# Patient Record
Sex: FEMALE | Race: BLACK OR AFRICAN AMERICAN | NOT HISPANIC OR LATINO | Employment: FULL TIME | URBAN - METROPOLITAN AREA
[De-identification: names, ages, dates, MRNs, and addresses within clinical notes are randomized per-mention and may not be internally consistent; named-entity substitution may affect disease eponyms.]

---

## 2019-08-06 LAB
PAP RECOMMENDATION EXT: NORMAL
PAP SMEAR: NORMAL

## 2021-11-22 ENCOUNTER — HISTORICAL (OUTPATIENT)
Dept: ADMINISTRATIVE | Facility: HOSPITAL | Age: 26
End: 2021-11-22

## 2022-03-30 ENCOUNTER — HISTORICAL (OUTPATIENT)
Dept: ADMINISTRATIVE | Facility: HOSPITAL | Age: 27
End: 2022-03-30

## 2022-03-30 LAB
ABS NEUT (OLG): 5.45 (ref 2.1–9.2)
ALBUMIN SERPL-MCNC: 3.9 G/DL (ref 3.5–5)
ALBUMIN/GLOB SERPL: 1.1 {RATIO} (ref 1.1–2)
ALP SERPL-CCNC: 50 U/L (ref 40–150)
ALT SERPL-CCNC: 9 U/L (ref 0–55)
AST SERPL-CCNC: 11 U/L (ref 5–34)
BASOPHILS # BLD AUTO: 0 10*3/UL (ref 0–0.2)
BASOPHILS NFR BLD AUTO: 0 %
BILIRUB SERPL-MCNC: 1 MG/DL
BILIRUBIN DIRECT+TOT PNL SERPL-MCNC: 0.4 (ref 0–0.5)
BILIRUBIN DIRECT+TOT PNL SERPL-MCNC: 0.6 (ref 0–0.8)
BUN SERPL-MCNC: 11.4 MG/DL (ref 7–18.7)
CALCIUM SERPL-MCNC: 9.5 MG/DL (ref 8.7–10.5)
CHLORIDE SERPL-SCNC: 103 MMOL/L (ref 98–107)
CHOLEST SERPL-MCNC: 169 MG/DL
CHOLEST/HDLC SERPL: 2 {RATIO} (ref 0–5)
CO2 SERPL-SCNC: 24 MMOL/L (ref 22–29)
CREAT SERPL-MCNC: 0.65 MG/DL (ref 0.55–1.02)
EOSINOPHIL # BLD AUTO: 0.1 10*3/UL (ref 0–0.9)
EOSINOPHIL NFR BLD AUTO: 1 %
ERYTHROCYTE [DISTWIDTH] IN BLOOD BY AUTOMATED COUNT: 18.6 % (ref 11.5–17)
GLOBULIN SER-MCNC: 3.5 G/DL (ref 2.4–3.5)
GLUCOSE SERPL-MCNC: 68 MG/DL (ref 74–100)
HCT VFR BLD AUTO: 37.1 % (ref 37–47)
HDLC SERPL-MCNC: 69 MG/DL (ref 35–60)
HEMOLYSIS INTERF INDEX SERPL-ACNC: <0
HGB BLD-MCNC: 11.7 G/DL (ref 12–16)
ICTERIC INTERF INDEX SERPL-ACNC: 1
IGA SERPL-MCNC: 121 MG/DL (ref 65–421)
LDLC SERPL CALC-MCNC: 85 MG/DL (ref 50–140)
LIPASE SERPL-CCNC: 24 U/L
LIPEMIC INTERF INDEX SERPL-ACNC: 0
LYMPHOCYTES # BLD AUTO: 1.8 10*3/UL (ref 0.6–4.6)
LYMPHOCYTES NFR BLD AUTO: 23 %
MANUAL DIFF? (OHS): NO
MCH RBC QN AUTO: 22.9 PG (ref 27–31)
MCHC RBC AUTO-ENTMCNC: 31.5 G/DL (ref 33–36)
MCV RBC AUTO: 72.7 FL (ref 80–94)
MONOCYTES # BLD AUTO: 0.4 10*3/UL (ref 0.1–1.3)
MONOCYTES NFR BLD AUTO: 5 %
NEUTROPHILS # BLD AUTO: 5.45 10*3/UL (ref 2.1–9.2)
NEUTROPHILS NFR BLD AUTO: 70 %
PLATELET # BLD AUTO: 349 10*3/UL (ref 130–400)
PMV BLD AUTO: 10.3 FL (ref 9.4–12.4)
POS ERR1 (OHS): NORMAL
POTASSIUM SERPL-SCNC: 4.2 MMOL/L (ref 3.5–5.1)
PROT SERPL-MCNC: 7.4 G/DL (ref 6.4–8.3)
RBC # BLD AUTO: 5.1 10*6/UL (ref 4.2–5.4)
SODIUM SERPL-SCNC: 139 MMOL/L (ref 136–145)
TRIGL SERPL-MCNC: 74 MG/DL (ref 37–140)
TSH SERPL-ACNC: 1.08 M[IU]/L (ref 0.35–4.94)
VLDLC SERPL CALC-MCNC: 15 MG/DL
WBC # SPEC AUTO: 7.8 10*3/UL (ref 4.5–11.5)

## 2022-04-20 ENCOUNTER — HISTORICAL (OUTPATIENT)
Dept: ANESTHESIOLOGY | Facility: HOSPITAL | Age: 27
End: 2022-04-20
Payer: COMMERCIAL

## 2022-04-20 ENCOUNTER — HISTORICAL (OUTPATIENT)
Dept: ADMINISTRATIVE | Facility: HOSPITAL | Age: 27
End: 2022-04-20
Payer: COMMERCIAL

## 2022-05-25 ENCOUNTER — HOSPITAL ENCOUNTER (OUTPATIENT)
Dept: RADIOLOGY | Facility: HOSPITAL | Age: 27
Discharge: HOME | End: 2022-05-25
Attending: FAMILY MEDICINE
Payer: COMMERCIAL

## 2022-05-25 ENCOUNTER — OFFICE VISIT (OUTPATIENT)
Dept: FAMILY MEDICINE | Facility: CLINIC | Age: 27
End: 2022-05-25
Payer: COMMERCIAL

## 2022-05-25 VITALS
OXYGEN SATURATION: 99 % | SYSTOLIC BLOOD PRESSURE: 120 MMHG | WEIGHT: 153.69 LBS | RESPIRATION RATE: 20 BRPM | BODY MASS INDEX: 30.98 KG/M2 | DIASTOLIC BLOOD PRESSURE: 75 MMHG | HEIGHT: 59 IN | HEART RATE: 88 BPM | TEMPERATURE: 99 F

## 2022-05-25 DIAGNOSIS — M25.532 LEFT WRIST PAIN: ICD-10-CM

## 2022-05-25 DIAGNOSIS — F90.2 ATTENTION DEFICIT HYPERACTIVITY DISORDER (ADHD), COMBINED TYPE: Chronic | ICD-10-CM

## 2022-05-25 PROBLEM — M25.539 ARTHRALGIA OF WRIST: Status: ACTIVE | Noted: 2022-04-08

## 2022-05-25 PROBLEM — E66.9 OBESITY: Status: ACTIVE | Noted: 2022-05-25

## 2022-05-25 PROCEDURE — 99203 OFFICE O/P NEW LOW 30 MIN: CPT | Mod: ,,, | Performed by: FAMILY MEDICINE

## 2022-05-25 PROCEDURE — 1159F PR MEDICATION LIST DOCUMENTED IN MEDICAL RECORD: ICD-10-PCS | Mod: CPTII,,, | Performed by: FAMILY MEDICINE

## 2022-05-25 PROCEDURE — 1160F RVW MEDS BY RX/DR IN RCRD: CPT | Mod: CPTII,,, | Performed by: FAMILY MEDICINE

## 2022-05-25 PROCEDURE — 1160F PR REVIEW ALL MEDS BY PRESCRIBER/CLIN PHARMACIST DOCUMENTED: ICD-10-PCS | Mod: CPTII,,, | Performed by: FAMILY MEDICINE

## 2022-05-25 PROCEDURE — 3074F SYST BP LT 130 MM HG: CPT | Mod: CPTII,,, | Performed by: FAMILY MEDICINE

## 2022-05-25 PROCEDURE — 3078F DIAST BP <80 MM HG: CPT | Mod: CPTII,,, | Performed by: FAMILY MEDICINE

## 2022-05-25 PROCEDURE — 1159F MED LIST DOCD IN RCRD: CPT | Mod: CPTII,,, | Performed by: FAMILY MEDICINE

## 2022-05-25 PROCEDURE — 3078F PR MOST RECENT DIASTOLIC BLOOD PRESSURE < 80 MM HG: ICD-10-PCS | Mod: CPTII,,, | Performed by: FAMILY MEDICINE

## 2022-05-25 PROCEDURE — 3008F BODY MASS INDEX DOCD: CPT | Mod: CPTII,,, | Performed by: FAMILY MEDICINE

## 2022-05-25 PROCEDURE — 99203 PR OFFICE/OUTPT VISIT, NEW, LEVL III, 30-44 MIN: ICD-10-PCS | Mod: ,,, | Performed by: FAMILY MEDICINE

## 2022-05-25 PROCEDURE — 73110 X-RAY EXAM OF WRIST: CPT | Mod: TC,LT

## 2022-05-25 PROCEDURE — 3074F PR MOST RECENT SYSTOLIC BLOOD PRESSURE < 130 MM HG: ICD-10-PCS | Mod: CPTII,,, | Performed by: FAMILY MEDICINE

## 2022-05-25 PROCEDURE — 3008F PR BODY MASS INDEX (BMI) DOCUMENTED: ICD-10-PCS | Mod: CPTII,,, | Performed by: FAMILY MEDICINE

## 2022-05-25 RX ORDER — DEXTROAMPHETAMINE SACCHARATE, AMPHETAMINE ASPARTATE, DEXTROAMPHETAMINE SULFATE AND AMPHETAMINE SULFATE 7.5; 7.5; 7.5; 7.5 MG/1; MG/1; MG/1; MG/1
1 TABLET ORAL 2 TIMES DAILY
COMMUNITY
Start: 2022-01-26 | End: 2022-05-25 | Stop reason: SDUPTHER

## 2022-05-25 RX ORDER — DEXTROAMPHETAMINE SACCHARATE, AMPHETAMINE ASPARTATE, DEXTROAMPHETAMINE SULFATE AND AMPHETAMINE SULFATE 7.5; 7.5; 7.5; 7.5 MG/1; MG/1; MG/1; MG/1
1 TABLET ORAL 2 TIMES DAILY
Qty: 60 TABLET | Refills: 0 | Status: SHIPPED | OUTPATIENT
Start: 2022-05-25 | End: 2022-06-26 | Stop reason: SDUPTHER

## 2022-05-25 RX ORDER — MAGNESIUM CARB/ALUMINUM HYDROX 105-160MG
2 TABLET,CHEWABLE ORAL 4 TIMES DAILY PRN
COMMUNITY
Start: 2022-05-04 | End: 2023-05-16 | Stop reason: ALTCHOICE

## 2022-05-25 RX ORDER — PANTOPRAZOLE SODIUM 40 MG/1
40 TABLET, DELAYED RELEASE ORAL DAILY
COMMUNITY
Start: 2022-04-14 | End: 2023-06-07

## 2022-05-25 RX ORDER — FAMOTIDINE 40 MG/1
40 TABLET, FILM COATED ORAL DAILY
COMMUNITY
Start: 2022-03-16 | End: 2022-10-26 | Stop reason: SDUPTHER

## 2022-05-25 RX ORDER — ETONOGESTREL AND ETHINYL ESTRADIOL VAGINAL RING .015; .12 MG/D; MG/D
RING VAGINAL
COMMUNITY
Start: 2022-04-27

## 2022-05-25 NOTE — PROGRESS NOTES
Subjective:      Patient ID: Yue Hickman is a 26 y.o. female.    Chief Complaint: Establish Care (Needs new PCP to follow her care and manage her ADD./Wrist pain-left.)    ADHD patient here to establish care for refills and surveillance of same.    Also, here for left wrist pain that happened at work. (onset 03/19/2022).  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    Has been wearing a brace since until this week. No x-ray, PO steroids were prescribed. Pain was improved but not absent as she rested her wrist or wore the brace. Now that she is not wearing the brace, she is experiencing pain again. Also, describes morning stiffness to wrist that takes hours to ease up if at all.       Problem List Items Addressed This Visit     ADHD (attention deficit hyperactivity disorder) (Chronic)    Relevant Medications    dextroamphetamine-amphetamine 30 mg Tab    Left wrist pain    Relevant Orders    X-Ray Wrist Complete Left          The patient's Health Maintenance was reviewed and the following appears to be due:   Health Maintenance Due   Topic Date Due    Hepatitis C Screening  Never done    COVID-19 Vaccine (1) Never done    HIV Screening  Never done    Pap Smear  Never done    TETANUS VACCINE  08/09/2017       Past Medical History:  Past Medical History:   Diagnosis Date    ADHD (attention deficit hyperactivity disorder)     Esophagitis     GERD (gastroesophageal reflux disease)      Past Surgical History:   Procedure Laterality Date    NEUROFIBROMA EXCISION      SINUS SURGERY      TONSILLECTOMY       Review of patient's allergies indicates:  No Known Allergies  Current Outpatient Medications on File Prior to Visit   Medication Sig Dispense Refill    etonogestreL-ethinyl estradioL (NUVARING) 0.12-0.015 mg/24 hr vaginal ring Place vaginally.      famotidine (PEPCID) 40 MG tablet Take 40 mg by mouth once daily.      GAVISCON EXTRA STRENGTH 160-105 mg  "Chew Take 2 tablets by mouth 4 (four) times daily as needed.      pantoprazole (PROTONIX) 40 MG tablet Take 40 mg by mouth once daily.      [DISCONTINUED] dextroamphetamine-amphetamine 30 mg Tab Take 1 tablet by mouth 2 (two) times daily.       No current facility-administered medications on file prior to visit.     Social History     Socioeconomic History    Marital status: Single    Number of children: 0   Tobacco Use    Smoking status: Never Smoker    Smokeless tobacco: Never Used   Substance and Sexual Activity    Alcohol use: Not Currently    Drug use: Not Currently     Family History   Problem Relation Age of Onset    Diabetes Mother     Hypertension Mother     Cancer Father        Review of Systems   Musculoskeletal: Positive for arthralgias and joint swelling.   All other systems reviewed and are negative.      Objective:   /75 (BP Location: Right arm, Patient Position: Sitting, BP Method: Large (Automatic))   Pulse 88   Temp 98.6 °F (37 °C) (Oral)   Resp 20   Ht 4' 11" (1.499 m)   Wt 69.7 kg (153 lb 11.2 oz)   LMP 05/21/2022   SpO2 99%   BMI 31.04 kg/m²     Physical Exam  Vitals and nursing note reviewed.   Constitutional:       Appearance: Normal appearance. She is obese.   HENT:      Head: Normocephalic and atraumatic.      Right Ear: Tympanic membrane, ear canal and external ear normal.      Left Ear: Tympanic membrane, ear canal and external ear normal.      Nose: Nose normal.      Mouth/Throat:      Mouth: Mucous membranes are moist.      Pharynx: Oropharynx is clear.   Eyes:      Extraocular Movements: Extraocular movements intact.      Conjunctiva/sclera: Conjunctivae normal.      Pupils: Pupils are equal, round, and reactive to light.   Cardiovascular:      Rate and Rhythm: Normal rate and regular rhythm.      Pulses: Normal pulses.      Heart sounds: Normal heart sounds.   Pulmonary:      Effort: Pulmonary effort is normal.      Breath sounds: Normal breath sounds. "   Abdominal:      General: Abdomen is flat. Bowel sounds are normal.      Palpations: Abdomen is soft.   Musculoskeletal:      Right wrist: Normal.      Left wrist: Tenderness, bony tenderness and snuff box tenderness present.      Right hand: Normal.      Left hand: Decreased range of motion.      Cervical back: Normal range of motion and neck supple.      Comments: Left thumb - pain with extension against resistance. Pain is localized to the base of thumb and distal end of radius. No tenderness to palpation of other carpal bones, No pronounced tendon swelling or tenderness, but pain with ROM and especially extension (passive and active, and against resistance)   Skin:     General: Skin is warm and dry.      Capillary Refill: Capillary refill takes less than 2 seconds.   Neurological:      General: No focal deficit present.      Mental Status: She is alert and oriented to person, place, and time. Mental status is at baseline.   Psychiatric:         Mood and Affect: Mood normal.         Behavior: Behavior normal.         Thought Content: Thought content normal.         Judgment: Judgment normal.         Historical on 03/30/2022   Component Date Value Ref Range Status    WBC 03/30/2022 7.8  4.5 - 11.5 Final    RBC 03/30/2022 5.10  4.20 - 5.40 Final    Hgb 03/30/2022 11.7  12.0 - 16.0 Final    Hct 03/30/2022 37.1  37.0 - 47.0 Final    MCV 03/30/2022 72.7  80.0 - 94.0 Final    MCH 03/30/2022 22.9  27.0 - 31.0 Final    MCHC 03/30/2022 31.5  33.0 - 36.0 Final    RDW 03/30/2022 18.6  11.5 - 17.0 Final    Platelet 03/30/2022 349  130 - 400 Final    MPV 03/30/2022 10.3  9.4 - 12.4 Final    Abs Neut 03/30/2022 5.45  2.10 - 9.20 Final    Manual Diff? 03/30/2022 No   Final    POS ERR1 03/30/2022 POSITIVEMORPH   Final    Neut % 03/30/2022 70   Final    Lymph % 03/30/2022 23   Final    Mono % 03/30/2022 5   Final    Eos % 03/30/2022 1   Final    Basophil % 03/30/2022 0   Final    Lymph # 03/30/2022 1.8  0.6 -  4.6 Final    Neut # 03/30/2022 5.45  2.10 - 9.20 Final    Mono # 03/30/2022 0.4  0.1 - 1.3 Final    Eos # 03/30/2022 0.1  0.0 - 0.9 Final    Baso # 03/30/2022 0.0  0.0 - 0.2 Final    Cholesterol Total 03/30/2022 169  <=200 Final    HDL Cholesterol 03/30/2022 69  35 - 60 Final    Triglyceride 03/30/2022 74  37 - 140 Final    Very Low Density Lipoprotein 03/30/2022 15   Final    Cholesterol/HDL Ratio 03/30/2022 2  0 - 5 Final    LDL Cholesterol 03/30/2022 85.00  50.00 - 140.00 Final    IgA Level 03/30/2022 121.0  65.0 - 421.0 Final    Sodium Level 03/30/2022 139  136 - 145 Final    Potassium Level 03/30/2022 4.2  3.5 - 5.1 Final    Chloride 03/30/2022 103  98 - 107 Final    Carbon Dioxide 03/30/2022 24  22 - 29 Final    Glucose Level 03/30/2022 68  74 - 100 Final    Blood Urea Nitrogen 03/30/2022 11.4  7.0 - 18.7 Final    Creatinine 03/30/2022 0.65  0.55 - 1.02 Final    Calcium Level Total 03/30/2022 9.5  8.7 - 10.5 Final    Albumin Level 03/30/2022 3.9  3.5 - 5.0 Final    Protein Total 03/30/2022 7.4  6.4 - 8.3 Final    Globulin 03/30/2022 3.5  2.4 - 3.5 Final    Albumin/Globulin Ratio 03/30/2022 1.1  1.1 - 2.0 Final    Alkaline Phosphatase 03/30/2022 50  40 - 150 Final    Bilirubin Total 03/30/2022 1.0  <=1.5 Final    Bilirubin Direct 03/30/2022 0.4  0.0 - 0.5 Final    Bilirubin Indirect 03/30/2022 0.60  0.00 - 0.80 Final    Aspartate Aminotransferase 03/30/2022 11  5 - 34 Final    Alanine Aminotransferase 03/30/2022 9  0 - 55 Final    Hemolysis 03/30/2022 <0   Final    Icterus 03/30/2022 1   Final    Lipemia 03/30/2022 0   Final    Lipase Level 03/30/2022 24  <=60 Final    Estimated GFR- 03/30/2022 >60   Final    Estimated GFR-Non  03/30/2022 >60   Final    Thyroid Stimulating Hormone 03/30/2022 1.0768  0.3500 - 4.9400 Final       NM Hepatobiliary Imaging with GB (HIDA)     INDICATION: R19.4  Upper abdominal pain. Heartburn.     TECHNIQUE: 7.9 mCi  of intravenous Choletec was administered followed  by focused gamma camera imaging of the abdomen.  8 ounces of boost was  given orally for purpose of inducing gallbladder contraction.     FINDINGS:     There is prompt hepatocellular uptake. Central biliary activity  identified with gallbladder filling by 15 minutes. There is  progressive gallbladder filling over 60 minutes. Small bowel activity  is identified by 30 minutes progressing through 60 minutes of imaging.        Region of interest was drawn over the gallbladder and counting  statistics used to determine the ejection fraction. Ejection fraction  was calculated to be 49%, above the normal range of greater than 35%.     IMPRESSION:     Negative hepatobiliary scintigraphy with normal gallbladder ejection  fraction.       Electronically Signed By: Gage Reyes MD  Date/Time Signed: 04/20/2022 10:49       Assessment:     1. Left wrist pain    2. Attention deficit hyperactivity disorder (ADHD), combined type      Plan:   I have changed Yue Hickman's dextroamphetamine-amphetamine. I am also having her maintain her etonogestreL-ethinyl estradioL, famotidine, pantoprazole, and GAVISCON EXTRA STRENGTH.  Problem List Items Addressed This Visit     ADHD (attention deficit hyperactivity disorder) (Chronic)    Relevant Medications    dextroamphetamine-amphetamine 30 mg Tab    Left wrist pain    Relevant Orders    X-Ray Wrist Complete Left        No follow-ups on file.    Yue was seen today for establish care.    Diagnoses and all orders for this visit:    Left wrist pain  -     X-Ray Wrist Complete Left; Future    Attention deficit hyperactivity disorder (ADHD), combined type  -     dextroamphetamine-amphetamine 30 mg Tab; Take 1 tablet (30 mg total) by mouth 2 (two) times daily.      Medications Ordered This Encounter   Medications    dextroamphetamine-amphetamine 30 mg Tab     Sig: Take 1 tablet (30 mg total) by mouth 2 (two) times daily.     Dispense:  60  tablet     Refill:  0     [unfilled]  Orders Placed This Encounter   Procedures    X-Ray Wrist Complete Left     Standing Status:   Future     Standing Expiration Date:   5/25/2023     Order Specific Question:   May the Radiologist modify the order per protocol to meet the clinical needs of the patient?     Answer:   Yes     Order Specific Question:   Release to patient     Answer:   Immediate       Medication List with Changes/Refills   Current Medications    ETONOGESTREL-ETHINYL ESTRADIOL (NUVARING) 0.12-0.015 MG/24 HR VAGINAL RING    Place vaginally.    FAMOTIDINE (PEPCID) 40 MG TABLET    Take 40 mg by mouth once daily.    GAVISCON EXTRA STRENGTH 160-105 MG CHEW    Take 2 tablets by mouth 4 (four) times daily as needed.    PANTOPRAZOLE (PROTONIX) 40 MG TABLET    Take 40 mg by mouth once daily.   Changed and/or Refilled Medications    Modified Medication Previous Medication    DEXTROAMPHETAMINE-AMPHETAMINE 30 MG TAB dextroamphetamine-amphetamine 30 mg Tab       Take 1 tablet (30 mg total) by mouth 2 (two) times daily.    Take 1 tablet by mouth 2 (two) times daily.      Medication List with Changes/Refills   Current Medications    ETONOGESTREL-ETHINYL ESTRADIOL (NUVARING) 0.12-0.015 MG/24 HR VAGINAL RING    Place vaginally.       Start Date: 4/27/2022 End Date: --    FAMOTIDINE (PEPCID) 40 MG TABLET    Take 40 mg by mouth once daily.       Start Date: 3/16/2022 End Date: --    GAVISCON EXTRA STRENGTH 160-105 MG CHEW    Take 2 tablets by mouth 4 (four) times daily as needed.       Start Date: 5/4/2022  End Date: --    PANTOPRAZOLE (PROTONIX) 40 MG TABLET    Take 40 mg by mouth once daily.       Start Date: 4/14/2022 End Date: --   Changed and/or Refilled Medications    Modified Medication Previous Medication    DEXTROAMPHETAMINE-AMPHETAMINE 30 MG TAB dextroamphetamine-amphetamine 30 mg Tab       Take 1 tablet (30 mg total) by mouth 2 (two) times daily.    Take 1 tablet by mouth 2 (two) times daily.       Start  Date: 5/25/2022 End Date: 6/24/2022    Start Date: 1/26/2022 End Date: 5/25/2022

## 2022-06-01 LAB
PAP RECOMMENDATION EXT: NORMAL
PAP SMEAR: NORMAL

## 2022-06-22 DIAGNOSIS — F90.2 ATTENTION DEFICIT HYPERACTIVITY DISORDER (ADHD), COMBINED TYPE: Chronic | ICD-10-CM

## 2022-06-22 NOTE — TELEPHONE ENCOUNTER
----- Message from Corrina Kennedy Patient Care Assistant sent at 6/22/2022  2:29 PM CDT -----  Regarding: med refill  Type:  RX Refill Request    Who Called: pt  Refill or New Rx: refill  RX Name and Strength: adderall  How is the patient currently taking it? (ex. 1XDay): 1 daily  Is this a 30 day or 90 day RX: 30  Preferred Pharmacy with phone number: [facility] or Mail Order: Local  Ordering Provider: Dr. mchugh  Would the patient rather a call back or a response via [facility]? C/b  Best Call Back Number: [phone]  Additional Information: pt states normally she makes an delores every 3 month and then she is able to get her script refilled until then and pharmacy is saying she has no refills. Pt is wondering what is going on with the refill status could yall please call pt back.

## 2022-06-26 DIAGNOSIS — F90.2 ATTENTION DEFICIT HYPERACTIVITY DISORDER (ADHD), COMBINED TYPE: Chronic | ICD-10-CM

## 2022-06-28 RX ORDER — DEXTROAMPHETAMINE SACCHARATE, AMPHETAMINE ASPARTATE, DEXTROAMPHETAMINE SULFATE AND AMPHETAMINE SULFATE 7.5; 7.5; 7.5; 7.5 MG/1; MG/1; MG/1; MG/1
1 TABLET ORAL 2 TIMES DAILY
Qty: 60 TABLET | Refills: 0 | Status: SHIPPED | OUTPATIENT
Start: 2022-06-28 | End: 2022-08-12 | Stop reason: SDUPTHER

## 2022-06-28 RX ORDER — DEXTROAMPHETAMINE SACCHARATE, AMPHETAMINE ASPARTATE, DEXTROAMPHETAMINE SULFATE AND AMPHETAMINE SULFATE 7.5; 7.5; 7.5; 7.5 MG/1; MG/1; MG/1; MG/1
1 TABLET ORAL 2 TIMES DAILY
Qty: 60 TABLET | Refills: 0 | Status: SHIPPED | OUTPATIENT
Start: 2022-06-28 | End: 2022-07-28

## 2022-08-12 DIAGNOSIS — F90.2 ATTENTION DEFICIT HYPERACTIVITY DISORDER (ADHD), COMBINED TYPE: Chronic | ICD-10-CM

## 2022-08-15 RX ORDER — DEXTROAMPHETAMINE SACCHARATE, AMPHETAMINE ASPARTATE, DEXTROAMPHETAMINE SULFATE AND AMPHETAMINE SULFATE 7.5; 7.5; 7.5; 7.5 MG/1; MG/1; MG/1; MG/1
1 TABLET ORAL 2 TIMES DAILY
Qty: 60 TABLET | Refills: 0 | Status: SHIPPED | OUTPATIENT
Start: 2022-08-15 | End: 2022-09-17 | Stop reason: SDUPTHER

## 2022-09-17 DIAGNOSIS — F90.2 ATTENTION DEFICIT HYPERACTIVITY DISORDER (ADHD), COMBINED TYPE: Chronic | ICD-10-CM

## 2022-09-19 RX ORDER — DEXTROAMPHETAMINE SACCHARATE, AMPHETAMINE ASPARTATE, DEXTROAMPHETAMINE SULFATE AND AMPHETAMINE SULFATE 7.5; 7.5; 7.5; 7.5 MG/1; MG/1; MG/1; MG/1
1 TABLET ORAL 2 TIMES DAILY
Qty: 60 TABLET | Refills: 0 | Status: SHIPPED | OUTPATIENT
Start: 2022-09-19 | End: 2022-10-26 | Stop reason: SDUPTHER

## 2022-10-13 DIAGNOSIS — F90.2 ATTENTION DEFICIT HYPERACTIVITY DISORDER (ADHD), COMBINED TYPE: Chronic | ICD-10-CM

## 2022-10-13 RX ORDER — DEXTROAMPHETAMINE SACCHARATE, AMPHETAMINE ASPARTATE, DEXTROAMPHETAMINE SULFATE AND AMPHETAMINE SULFATE 7.5; 7.5; 7.5; 7.5 MG/1; MG/1; MG/1; MG/1
1 TABLET ORAL 2 TIMES DAILY
Qty: 60 TABLET | Refills: 0 | OUTPATIENT
Start: 2022-10-13 | End: 2022-11-12

## 2022-10-26 ENCOUNTER — OFFICE VISIT (OUTPATIENT)
Dept: FAMILY MEDICINE | Facility: CLINIC | Age: 27
End: 2022-10-26
Payer: COMMERCIAL

## 2022-10-26 VITALS
RESPIRATION RATE: 18 BRPM | TEMPERATURE: 98 F | HEIGHT: 59 IN | HEART RATE: 99 BPM | BODY MASS INDEX: 30.58 KG/M2 | OXYGEN SATURATION: 99 % | WEIGHT: 151.69 LBS | SYSTOLIC BLOOD PRESSURE: 130 MMHG | DIASTOLIC BLOOD PRESSURE: 80 MMHG

## 2022-10-26 DIAGNOSIS — Z23 NEED FOR VACCINATION: ICD-10-CM

## 2022-10-26 DIAGNOSIS — K21.9 GASTROESOPHAGEAL REFLUX DISEASE WITHOUT ESOPHAGITIS: Chronic | ICD-10-CM

## 2022-10-26 DIAGNOSIS — Z28.21 VACCINATION REFUSED BY PATIENT: ICD-10-CM

## 2022-10-26 DIAGNOSIS — F90.2 ATTENTION DEFICIT HYPERACTIVITY DISORDER (ADHD), COMBINED TYPE: Primary | Chronic | ICD-10-CM

## 2022-10-26 DIAGNOSIS — Z12.4 CERVICAL CANCER SCREENING: ICD-10-CM

## 2022-10-26 PROCEDURE — 3075F SYST BP GE 130 - 139MM HG: CPT | Mod: CPTII,,, | Performed by: FAMILY MEDICINE

## 2022-10-26 PROCEDURE — 99214 OFFICE O/P EST MOD 30 MIN: CPT | Mod: ,,, | Performed by: FAMILY MEDICINE

## 2022-10-26 PROCEDURE — 1159F PR MEDICATION LIST DOCUMENTED IN MEDICAL RECORD: ICD-10-PCS | Mod: CPTII,,, | Performed by: FAMILY MEDICINE

## 2022-10-26 PROCEDURE — 1160F PR REVIEW ALL MEDS BY PRESCRIBER/CLIN PHARMACIST DOCUMENTED: ICD-10-PCS | Mod: CPTII,,, | Performed by: FAMILY MEDICINE

## 2022-10-26 PROCEDURE — 1160F RVW MEDS BY RX/DR IN RCRD: CPT | Mod: CPTII,,, | Performed by: FAMILY MEDICINE

## 2022-10-26 PROCEDURE — 1159F MED LIST DOCD IN RCRD: CPT | Mod: CPTII,,, | Performed by: FAMILY MEDICINE

## 2022-10-26 PROCEDURE — 3075F PR MOST RECENT SYSTOLIC BLOOD PRESS GE 130-139MM HG: ICD-10-PCS | Mod: CPTII,,, | Performed by: FAMILY MEDICINE

## 2022-10-26 PROCEDURE — 99214 PR OFFICE/OUTPT VISIT, EST, LEVL IV, 30-39 MIN: ICD-10-PCS | Mod: ,,, | Performed by: FAMILY MEDICINE

## 2022-10-26 PROCEDURE — 3079F DIAST BP 80-89 MM HG: CPT | Mod: CPTII,,, | Performed by: FAMILY MEDICINE

## 2022-10-26 PROCEDURE — 3079F PR MOST RECENT DIASTOLIC BLOOD PRESSURE 80-89 MM HG: ICD-10-PCS | Mod: CPTII,,, | Performed by: FAMILY MEDICINE

## 2022-10-26 RX ORDER — DEXTROAMPHETAMINE SACCHARATE, AMPHETAMINE ASPARTATE, DEXTROAMPHETAMINE SULFATE AND AMPHETAMINE SULFATE 7.5; 7.5; 7.5; 7.5 MG/1; MG/1; MG/1; MG/1
1 TABLET ORAL 2 TIMES DAILY
Qty: 60 TABLET | Refills: 0 | Status: SHIPPED | OUTPATIENT
Start: 2022-10-26 | End: 2022-11-25

## 2022-10-26 RX ORDER — DEXTROAMPHETAMINE SACCHARATE, AMPHETAMINE ASPARTATE, DEXTROAMPHETAMINE SULFATE AND AMPHETAMINE SULFATE 7.5; 7.5; 7.5; 7.5 MG/1; MG/1; MG/1; MG/1
1 TABLET ORAL 2 TIMES DAILY
Qty: 60 TABLET | Refills: 0 | Status: SHIPPED | OUTPATIENT
Start: 2022-12-25 | End: 2023-01-18 | Stop reason: SDUPTHER

## 2022-10-26 RX ORDER — DEXTROAMPHETAMINE SACCHARATE, AMPHETAMINE ASPARTATE, DEXTROAMPHETAMINE SULFATE AND AMPHETAMINE SULFATE 7.5; 7.5; 7.5; 7.5 MG/1; MG/1; MG/1; MG/1
1 TABLET ORAL 2 TIMES DAILY
Qty: 60 TABLET | Refills: 0 | Status: SHIPPED | OUTPATIENT
Start: 2022-11-25 | End: 2022-12-25

## 2022-10-26 RX ORDER — FAMOTIDINE 40 MG/1
40 TABLET, FILM COATED ORAL DAILY
Qty: 90 TABLET | Refills: 1 | Status: SHIPPED | OUTPATIENT
Start: 2022-10-26 | End: 2023-06-07 | Stop reason: SDUPTHER

## 2022-10-26 NOTE — PROGRESS NOTES
Subjective:      Patient ID: Yue Hickman is a 26 y.o. female.    Chief Complaint: Follow-up (Medication refill)      Patient is here for a 3 month ADHD surveillance visit. Medication is working well. No complaints. No changes desired. Refills needed.    Also seeking refills of medication for GERD    Declines recommended vaccines at this time  Problem List Items Addressed This Visit       ADHD (attention deficit hyperactivity disorder) (Chronic)    Relevant Medications    dextroamphetamine-amphetamine 30 mg Tab    dextroamphetamine-amphetamine 30 mg Tab (Start on 11/25/2022)    dextroamphetamine-amphetamine 30 mg Tab (Start on 12/25/2022)    GERD (gastroesophageal reflux disease) (Chronic)    Relevant Medications    famotidine (PEPCID) 40 MG tablet     Other Visit Diagnoses       Need for vaccination    -  Primary    Vaccination refused by patient        Cervical cancer screening                The patient's Health Maintenance was reviewed and the following appears to be due:   Health Maintenance Due   Topic Date Due    HIV Screening  Never done    Pap Smear  Never done       Past Medical History:  Past Medical History:   Diagnosis Date    ADHD (attention deficit hyperactivity disorder)     Esophagitis     GERD (gastroesophageal reflux disease)      Past Surgical History:   Procedure Laterality Date    NEUROFIBROMA EXCISION      SINUS SURGERY      TONSILLECTOMY       Review of patient's allergies indicates:  No Known Allergies  Current Outpatient Medications on File Prior to Visit   Medication Sig Dispense Refill    etonogestreL-ethinyl estradioL (NUVARING) 0.12-0.015 mg/24 hr vaginal ring Place vaginally.      [DISCONTINUED] dextroamphetamine-amphetamine 30 mg Tab Take 1 tablet (30 mg total) by mouth 2 (two) times daily. 60 tablet 0    GAVISCON EXTRA STRENGTH 160-105 mg Chew Take 2 tablets by mouth 4 (four) times daily as needed.      pantoprazole (PROTONIX) 40 MG tablet Take 40 mg by mouth once daily.       "[DISCONTINUED] famotidine (PEPCID) 40 MG tablet Take 40 mg by mouth once daily.       No current facility-administered medications on file prior to visit.     Social History     Socioeconomic History    Marital status: Single    Number of children: 0   Tobacco Use    Smoking status: Never    Smokeless tobacco: Never   Substance and Sexual Activity    Alcohol use: Not Currently    Drug use: Not Currently     Family History   Problem Relation Age of Onset    Diabetes Mother     Hypertension Mother     Cancer Father        Review of Systems   All other systems reviewed and are negative.    Objective:   /80 (BP Location: Right arm, Patient Position: Sitting, BP Method: Medium (Automatic))   Pulse 99   Temp 98.2 °F (36.8 °C) (Oral)   Resp 18   Ht 4' 11" (1.499 m)   Wt 68.8 kg (151 lb 11.2 oz)   SpO2 99%   BMI 30.64 kg/m²     Physical Exam  Vitals and nursing note reviewed.   Constitutional:       Appearance: Normal appearance. She is normal weight.   HENT:      Head: Normocephalic and atraumatic.      Right Ear: Tympanic membrane, ear canal and external ear normal.      Left Ear: Tympanic membrane, ear canal and external ear normal.      Nose: Nose normal.      Mouth/Throat:      Mouth: Mucous membranes are moist.      Pharynx: Oropharynx is clear.   Eyes:      Extraocular Movements: Extraocular movements intact.      Conjunctiva/sclera: Conjunctivae normal.      Pupils: Pupils are equal, round, and reactive to light.   Cardiovascular:      Rate and Rhythm: Normal rate and regular rhythm.      Pulses: Normal pulses.      Heart sounds: Normal heart sounds.   Pulmonary:      Effort: Pulmonary effort is normal.      Breath sounds: Normal breath sounds.   Abdominal:      General: Abdomen is flat. Bowel sounds are normal.      Palpations: Abdomen is soft.   Musculoskeletal:         General: Normal range of motion.      Cervical back: Normal range of motion and neck supple.   Skin:     General: Skin is warm and " dry.      Capillary Refill: Capillary refill takes less than 2 seconds.   Neurological:      General: No focal deficit present.      Mental Status: She is alert and oriented to person, place, and time. Mental status is at baseline.   Psychiatric:         Mood and Affect: Mood normal.         Behavior: Behavior normal.         Thought Content: Thought content normal.         Judgment: Judgment normal.       Procedures     No visits with results within 6 Month(s) from this visit.   Latest known visit with results is:   Historical on 03/30/2022   Component Date Value Ref Range Status    WBC 03/30/2022 7.8  4.5 - 11.5 Final    RBC 03/30/2022 5.10  4.20 - 5.40 Final    Hgb 03/30/2022 11.7  12.0 - 16.0 Final    Hct 03/30/2022 37.1  37.0 - 47.0 Final    MCV 03/30/2022 72.7  80.0 - 94.0 Final    MCH 03/30/2022 22.9  27.0 - 31.0 Final    MCHC 03/30/2022 31.5  33.0 - 36.0 Final    RDW 03/30/2022 18.6  11.5 - 17.0 Final    Platelet 03/30/2022 349  130 - 400 Final    MPV 03/30/2022 10.3  9.4 - 12.4 Final    Abs Neut 03/30/2022 5.45  2.10 - 9.20 Final    Manual Diff? 03/30/2022 No   Final    POS ERR1 03/30/2022 POSITIVEMORPH   Final    Neut % 03/30/2022 70   Final    Lymph % 03/30/2022 23   Final    Mono % 03/30/2022 5   Final    Eos % 03/30/2022 1   Final    Basophil % 03/30/2022 0   Final    Lymph # 03/30/2022 1.8  0.6 - 4.6 Final    Neut # 03/30/2022 5.45  2.10 - 9.20 Final    Mono # 03/30/2022 0.4  0.1 - 1.3 Final    Eos # 03/30/2022 0.1  0.0 - 0.9 Final    Baso # 03/30/2022 0.0  0.0 - 0.2 Final    Cholesterol Total 03/30/2022 169  <=200 Final    HDL Cholesterol 03/30/2022 69  35 - 60 Final    Triglyceride 03/30/2022 74  37 - 140 Final    Very Low Density Lipoprotein 03/30/2022 15   Final    Cholesterol/HDL Ratio 03/30/2022 2  0 - 5 Final    LDL Cholesterol 03/30/2022 85.00  50.00 - 140.00 Final    IgA Level 03/30/2022 121.0  65.0 - 421.0 Final    Sodium Level 03/30/2022 139  136 - 145 Final    Potassium Level 03/30/2022 4.2   3.5 - 5.1 Final    Chloride 03/30/2022 103  98 - 107 Final    Carbon Dioxide 03/30/2022 24  22 - 29 Final    Glucose Level 03/30/2022 68  74 - 100 Final    Blood Urea Nitrogen 03/30/2022 11.4  7.0 - 18.7 Final    Creatinine 03/30/2022 0.65  0.55 - 1.02 Final    Calcium Level Total 03/30/2022 9.5  8.7 - 10.5 Final    Albumin Level 03/30/2022 3.9  3.5 - 5.0 Final    Protein Total 03/30/2022 7.4  6.4 - 8.3 Final    Globulin 03/30/2022 3.5  2.4 - 3.5 Final    Albumin/Globulin Ratio 03/30/2022 1.1  1.1 - 2.0 Final    Alkaline Phosphatase 03/30/2022 50  40 - 150 Final    Bilirubin Total 03/30/2022 1.0  <=1.5 Final    Bilirubin Direct 03/30/2022 0.4  0.0 - 0.5 Final    Bilirubin Indirect 03/30/2022 0.60  0.00 - 0.80 Final    Aspartate Aminotransferase 03/30/2022 11  5 - 34 Final    Alanine Aminotransferase 03/30/2022 9  0 - 55 Final    Hemolysis 03/30/2022 <0   Final    Icterus 03/30/2022 1   Final    Lipemia 03/30/2022 0   Final    Lipase Level 03/30/2022 24  <=60 Final    Estimated GFR- 03/30/2022 >60   Final    Estimated GFR-Non  03/30/2022 >60   Final    Thyroid Stimulating Hormone 03/30/2022 1.0768  0.3500 - 4.9400 Final            Assessment:     1. Need for vaccination    2. Attention deficit hyperactivity disorder (ADHD), combined type    3. Gastroesophageal reflux disease without esophagitis    4. Vaccination refused by patient    5. Cervical cancer screening      Plan:   I have changed Yue Hickman's famotidine. I am also having her start on dextroamphetamine-amphetamine and dextroamphetamine-amphetamine. Additionally, I am having her maintain her etonogestreL-ethinyl estradioL, pantoprazole, GAVISCON EXTRA STRENGTH, and dextroamphetamine-amphetamine.  Problem List Items Addressed This Visit       ADHD (attention deficit hyperactivity disorder) (Chronic)    Relevant Medications    dextroamphetamine-amphetamine 30 mg Tab    dextroamphetamine-amphetamine 30 mg Tab (Start on  11/25/2022)    dextroamphetamine-amphetamine 30 mg Tab (Start on 12/25/2022)    GERD (gastroesophageal reflux disease) (Chronic)    Relevant Medications    famotidine (PEPCID) 40 MG tablet     Other Visit Diagnoses       Need for vaccination    -  Primary    Vaccination refused by patient        Cervical cancer screening              Follow up in about 3 months (around 1/26/2023) for request pap report from OBGYN.    Yue was seen today for follow-up.    Diagnoses and all orders for this visit:        Attention deficit hyperactivity disorder (ADHD), combined type  -     dextroamphetamine-amphetamine 30 mg Tab; Take 1 tablet (30 mg total) by mouth 2 (two) times daily.  -     dextroamphetamine-amphetamine 30 mg Tab; Take 1 tablet (30 mg total) by mouth 2 (two) times daily.  -     dextroamphetamine-amphetamine 30 mg Tab; Take 1 tablet (30 mg total) by mouth 2 (two) times daily.   Continue current prescription medications. Refills as needed   Condition/Symptoms controlled/stable   Surveillance labs ordered as needed, or reviewed in visit.   RTC 3 months (as scheduled) or PRN      Gastroesophageal reflux disease without esophagitis  -     famotidine (PEPCID) 40 MG tablet; Take 1 tablet (40 mg total) by mouth once daily.   Continue current prescription medications. Refills as needed   Condition/Symptoms controlled/stable   Surveillance labs ordered as needed, or reviewed in visit.   RTC 6 months (as scheduled) or PRN    Need for vaccination  Vaccination refused by patient  Documented for chart completeness and HCC    Cervical cancer screening  Request record from OBGYN    Medications Ordered This Encounter   Medications    dextroamphetamine-amphetamine 30 mg Tab     Sig: Take 1 tablet (30 mg total) by mouth 2 (two) times daily.     Dispense:  60 tablet     Refill:  0    dextroamphetamine-amphetamine 30 mg Tab     Sig: Take 1 tablet (30 mg total) by mouth 2 (two) times daily.     Dispense:  60 tablet     Refill:  0     dextroamphetamine-amphetamine 30 mg Tab     Sig: Take 1 tablet (30 mg total) by mouth 2 (two) times daily.     Dispense:  60 tablet     Refill:  0    famotidine (PEPCID) 40 MG tablet     Sig: Take 1 tablet (40 mg total) by mouth once daily.     Dispense:  90 tablet     Refill:  1     [unfilled]  No orders of the defined types were placed in this encounter.      Medication List with Changes/Refills   New Medications    DEXTROAMPHETAMINE-AMPHETAMINE 30 MG TAB    Take 1 tablet (30 mg total) by mouth 2 (two) times daily.    DEXTROAMPHETAMINE-AMPHETAMINE 30 MG TAB    Take 1 tablet (30 mg total) by mouth 2 (two) times daily.   Current Medications    ETONOGESTREL-ETHINYL ESTRADIOL (NUVARING) 0.12-0.015 MG/24 HR VAGINAL RING    Place vaginally.    GAVISCON EXTRA STRENGTH 160-105 MG CHEW    Take 2 tablets by mouth 4 (four) times daily as needed.    PANTOPRAZOLE (PROTONIX) 40 MG TABLET    Take 40 mg by mouth once daily.   Changed and/or Refilled Medications    Modified Medication Previous Medication    DEXTROAMPHETAMINE-AMPHETAMINE 30 MG TAB dextroamphetamine-amphetamine 30 mg Tab       Take 1 tablet (30 mg total) by mouth 2 (two) times daily.    Take 1 tablet (30 mg total) by mouth 2 (two) times daily.    FAMOTIDINE (PEPCID) 40 MG TABLET famotidine (PEPCID) 40 MG tablet       Take 1 tablet (40 mg total) by mouth once daily.    Take 40 mg by mouth once daily.      Medication List with Changes/Refills   New Medications    DEXTROAMPHETAMINE-AMPHETAMINE 30 MG TAB    Take 1 tablet (30 mg total) by mouth 2 (two) times daily.       Start Date: 11/25/2022End Date: 12/25/2022    DEXTROAMPHETAMINE-AMPHETAMINE 30 MG TAB    Take 1 tablet (30 mg total) by mouth 2 (two) times daily.       Start Date: 12/25/2022End Date: 1/24/2023   Current Medications    ETONOGESTREL-ETHINYL ESTRADIOL (NUVARING) 0.12-0.015 MG/24 HR VAGINAL RING    Place vaginally.       Start Date: 4/27/2022 End Date: --    GAVISCON EXTRA STRENGTH 160-105 MG CHEW    Take  2 tablets by mouth 4 (four) times daily as needed.       Start Date: 5/4/2022  End Date: --    PANTOPRAZOLE (PROTONIX) 40 MG TABLET    Take 40 mg by mouth once daily.       Start Date: 4/14/2022 End Date: --   Changed and/or Refilled Medications    Modified Medication Previous Medication    DEXTROAMPHETAMINE-AMPHETAMINE 30 MG TAB dextroamphetamine-amphetamine 30 mg Tab       Take 1 tablet (30 mg total) by mouth 2 (two) times daily.    Take 1 tablet (30 mg total) by mouth 2 (two) times daily.       Start Date: 10/26/2022End Date: 11/25/2022    Start Date: 9/19/2022 End Date: 10/26/2022    FAMOTIDINE (PEPCID) 40 MG TABLET famotidine (PEPCID) 40 MG tablet       Take 1 tablet (40 mg total) by mouth once daily.    Take 40 mg by mouth once daily.       Start Date: 10/26/2022End Date: 4/24/2023    Start Date: 3/16/2022 End Date: 10/26/2022

## 2022-11-04 ENCOUNTER — DOCUMENTATION ONLY (OUTPATIENT)
Dept: ADMINISTRATIVE | Facility: HOSPITAL | Age: 27
End: 2022-11-04
Payer: COMMERCIAL

## 2022-11-29 ENCOUNTER — DOCUMENTATION ONLY (OUTPATIENT)
Dept: ADMINISTRATIVE | Facility: HOSPITAL | Age: 27
End: 2022-11-29
Payer: COMMERCIAL

## 2022-11-29 DIAGNOSIS — K21.9 GASTROESOPHAGEAL REFLUX DISEASE WITHOUT ESOPHAGITIS: Chronic | ICD-10-CM

## 2022-11-29 DIAGNOSIS — F90.2 ATTENTION DEFICIT HYPERACTIVITY DISORDER (ADHD), COMBINED TYPE: Chronic | ICD-10-CM

## 2022-11-29 RX ORDER — FAMOTIDINE 40 MG/1
40 TABLET, FILM COATED ORAL DAILY
Qty: 90 TABLET | Refills: 1 | Status: CANCELLED | OUTPATIENT
Start: 2022-11-29 | End: 2023-05-28

## 2022-11-29 RX ORDER — DEXTROAMPHETAMINE SACCHARATE, AMPHETAMINE ASPARTATE, DEXTROAMPHETAMINE SULFATE AND AMPHETAMINE SULFATE 7.5; 7.5; 7.5; 7.5 MG/1; MG/1; MG/1; MG/1
1 TABLET ORAL 2 TIMES DAILY
Qty: 60 TABLET | Refills: 0 | Status: CANCELLED | OUTPATIENT
Start: 2022-11-29 | End: 2022-12-29

## 2022-11-30 NOTE — TELEPHONE ENCOUNTER
Spoke to pt. Informed that refills for Adderall (sent 11/25/22) and Pepcid (sent 10/26/22) can be picked up at her pharmacy.

## 2023-01-18 ENCOUNTER — OFFICE VISIT (OUTPATIENT)
Dept: FAMILY MEDICINE | Facility: CLINIC | Age: 28
End: 2023-01-18
Payer: COMMERCIAL

## 2023-01-18 VITALS
SYSTOLIC BLOOD PRESSURE: 117 MMHG | DIASTOLIC BLOOD PRESSURE: 77 MMHG | OXYGEN SATURATION: 99 % | BODY MASS INDEX: 31.08 KG/M2 | HEIGHT: 59 IN | HEART RATE: 87 BPM | TEMPERATURE: 98 F | WEIGHT: 154.19 LBS | RESPIRATION RATE: 18 BRPM

## 2023-01-18 DIAGNOSIS — F90.2 ATTENTION DEFICIT HYPERACTIVITY DISORDER (ADHD), COMBINED TYPE: Primary | Chronic | ICD-10-CM

## 2023-01-18 DIAGNOSIS — E66.09 CLASS 1 OBESITY DUE TO EXCESS CALORIES WITHOUT SERIOUS COMORBIDITY WITH BODY MASS INDEX (BMI) OF 31.0 TO 31.9 IN ADULT: Chronic | ICD-10-CM

## 2023-01-18 PROBLEM — E66.811 CLASS 1 OBESITY DUE TO EXCESS CALORIES WITHOUT SERIOUS COMORBIDITY WITH BODY MASS INDEX (BMI) OF 31.0 TO 31.9 IN ADULT: Chronic | Status: ACTIVE | Noted: 2022-05-25

## 2023-01-18 PROCEDURE — 1159F MED LIST DOCD IN RCRD: CPT | Mod: CPTII,,, | Performed by: FAMILY MEDICINE

## 2023-01-18 PROCEDURE — 1160F RVW MEDS BY RX/DR IN RCRD: CPT | Mod: CPTII,,, | Performed by: FAMILY MEDICINE

## 2023-01-18 PROCEDURE — 3078F PR MOST RECENT DIASTOLIC BLOOD PRESSURE < 80 MM HG: ICD-10-PCS | Mod: CPTII,,, | Performed by: FAMILY MEDICINE

## 2023-01-18 PROCEDURE — 3008F BODY MASS INDEX DOCD: CPT | Mod: CPTII,,, | Performed by: FAMILY MEDICINE

## 2023-01-18 PROCEDURE — 1159F PR MEDICATION LIST DOCUMENTED IN MEDICAL RECORD: ICD-10-PCS | Mod: CPTII,,, | Performed by: FAMILY MEDICINE

## 2023-01-18 PROCEDURE — 1160F PR REVIEW ALL MEDS BY PRESCRIBER/CLIN PHARMACIST DOCUMENTED: ICD-10-PCS | Mod: CPTII,,, | Performed by: FAMILY MEDICINE

## 2023-01-18 PROCEDURE — 3008F PR BODY MASS INDEX (BMI) DOCUMENTED: ICD-10-PCS | Mod: CPTII,,, | Performed by: FAMILY MEDICINE

## 2023-01-18 PROCEDURE — 3078F DIAST BP <80 MM HG: CPT | Mod: CPTII,,, | Performed by: FAMILY MEDICINE

## 2023-01-18 PROCEDURE — 3074F SYST BP LT 130 MM HG: CPT | Mod: CPTII,,, | Performed by: FAMILY MEDICINE

## 2023-01-18 PROCEDURE — 99214 PR OFFICE/OUTPT VISIT, EST, LEVL IV, 30-39 MIN: ICD-10-PCS | Mod: ,,, | Performed by: FAMILY MEDICINE

## 2023-01-18 PROCEDURE — 3074F PR MOST RECENT SYSTOLIC BLOOD PRESSURE < 130 MM HG: ICD-10-PCS | Mod: CPTII,,, | Performed by: FAMILY MEDICINE

## 2023-01-18 PROCEDURE — 99214 OFFICE O/P EST MOD 30 MIN: CPT | Mod: ,,, | Performed by: FAMILY MEDICINE

## 2023-01-18 RX ORDER — DEXTROAMPHETAMINE SACCHARATE, AMPHETAMINE ASPARTATE, DEXTROAMPHETAMINE SULFATE AND AMPHETAMINE SULFATE 7.5; 7.5; 7.5; 7.5 MG/1; MG/1; MG/1; MG/1
1 TABLET ORAL 2 TIMES DAILY
Qty: 60 TABLET | Refills: 0 | Status: SHIPPED | OUTPATIENT
Start: 2023-03-19 | End: 2023-05-16 | Stop reason: SDUPTHER

## 2023-01-18 RX ORDER — DEXTROAMPHETAMINE SACCHARATE, AMPHETAMINE ASPARTATE, DEXTROAMPHETAMINE SULFATE AND AMPHETAMINE SULFATE 7.5; 7.5; 7.5; 7.5 MG/1; MG/1; MG/1; MG/1
1 TABLET ORAL 2 TIMES DAILY
Qty: 60 TABLET | Refills: 0 | Status: SHIPPED | OUTPATIENT
Start: 2023-03-19 | End: 2023-01-18

## 2023-01-18 RX ORDER — DEXTROAMPHETAMINE SACCHARATE, AMPHETAMINE ASPARTATE, DEXTROAMPHETAMINE SULFATE AND AMPHETAMINE SULFATE 7.5; 7.5; 7.5; 7.5 MG/1; MG/1; MG/1; MG/1
1 TABLET ORAL 2 TIMES DAILY
Qty: 60 TABLET | Refills: 0 | Status: SHIPPED | OUTPATIENT
Start: 2023-01-18 | End: 2023-01-18

## 2023-01-18 RX ORDER — DEXTROAMPHETAMINE SACCHARATE, AMPHETAMINE ASPARTATE, DEXTROAMPHETAMINE SULFATE AND AMPHETAMINE SULFATE 7.5; 7.5; 7.5; 7.5 MG/1; MG/1; MG/1; MG/1
1 TABLET ORAL 2 TIMES DAILY
Qty: 60 TABLET | Refills: 0 | Status: SHIPPED | OUTPATIENT
Start: 2023-02-17 | End: 2023-03-19

## 2023-01-18 RX ORDER — DEXTROAMPHETAMINE SACCHARATE, AMPHETAMINE ASPARTATE, DEXTROAMPHETAMINE SULFATE AND AMPHETAMINE SULFATE 7.5; 7.5; 7.5; 7.5 MG/1; MG/1; MG/1; MG/1
1 TABLET ORAL 2 TIMES DAILY
Qty: 60 TABLET | Refills: 0 | Status: SHIPPED | OUTPATIENT
Start: 2023-01-18 | End: 2023-02-17

## 2023-01-18 RX ORDER — AMOXICILLIN AND CLAVULANATE POTASSIUM 875; 125 MG/1; MG/1
1 TABLET, FILM COATED ORAL 2 TIMES DAILY
COMMUNITY
Start: 2023-01-13 | End: 2023-06-07 | Stop reason: ALTCHOICE

## 2023-01-18 RX ORDER — DEXTROAMPHETAMINE SACCHARATE, AMPHETAMINE ASPARTATE, DEXTROAMPHETAMINE SULFATE AND AMPHETAMINE SULFATE 7.5; 7.5; 7.5; 7.5 MG/1; MG/1; MG/1; MG/1
1 TABLET ORAL 2 TIMES DAILY
Qty: 60 TABLET | Refills: 0 | Status: SHIPPED | OUTPATIENT
Start: 2023-02-17 | End: 2023-01-18

## 2023-01-18 NOTE — PROGRESS NOTES
Subjective:      Patient ID: Yue Hickman is a 27 y.o. female.    Chief Complaint: Medication Refill (Adderall (hard copy due to shortage))    Patient is here for a 3 month ADHD surveillance visit. Medication is working well. No complaints. No changes desired. Refills needed.'      Problem List Items Addressed This Visit       Class 1 obesity due to excess calories without serious comorbidity with body mass index (BMI) of 31.0 to 31.9 in adult (Chronic)    ADHD (attention deficit hyperactivity disorder) - Primary (Chronic)    Relevant Medications    dextroamphetamine-amphetamine 30 mg Tab    dextroamphetamine-amphetamine 30 mg Tab (Start on 2/17/2023)    dextroamphetamine-amphetamine 30 mg Tab (Start on 3/19/2023)       The patient's Health Maintenance was reviewed and the following appears to be due:   Health Maintenance Due   Topic Date Due    HIV Screening  Never done       Past Medical History:  Past Medical History:   Diagnosis Date    ADHD (attention deficit hyperactivity disorder)     Esophagitis     GERD (gastroesophageal reflux disease)      Past Surgical History:   Procedure Laterality Date    NEUROFIBROMA EXCISION      SINUS SURGERY      TONSILLECTOMY       Review of patient's allergies indicates:  No Known Allergies  Current Outpatient Medications on File Prior to Visit   Medication Sig Dispense Refill    amoxicillin-clavulanate 875-125mg (AUGMENTIN) 875-125 mg per tablet Take 1 tablet by mouth 2 (two) times daily.      etonogestreL-ethinyl estradioL (NUVARING) 0.12-0.015 mg/24 hr vaginal ring Place vaginally.      famotidine (PEPCID) 40 MG tablet Take 1 tablet (40 mg total) by mouth once daily. 90 tablet 1    [DISCONTINUED] dextroamphetamine-amphetamine 30 mg Tab Take 1 tablet (30 mg total) by mouth 2 (two) times daily. 60 tablet 0    GAVISCON EXTRA STRENGTH 160-105 mg Chew Take 2 tablets by mouth 4 (four) times daily as needed.      pantoprazole (PROTONIX) 40 MG tablet Take 40 mg by mouth once daily.  "      No current facility-administered medications on file prior to visit.     Social History     Socioeconomic History    Marital status: Single    Number of children: 0   Tobacco Use    Smoking status: Never    Smokeless tobacco: Never   Substance and Sexual Activity    Alcohol use: Yes     Comment: Occasionally    Drug use: Not Currently     Family History   Problem Relation Age of Onset    Diabetes Mother     Hypertension Mother     Cancer Father        Review of Systems   All other systems reviewed and are negative.    Objective:   /77 (BP Location: Right arm, Patient Position: Sitting, BP Method: Large (Automatic))   Pulse 87   Temp 97.7 °F (36.5 °C) (Oral)   Resp 18   Ht 4' 11" (1.499 m)   Wt 69.9 kg (154 lb 3.2 oz)   LMP 01/02/2023   SpO2 99%   BMI 31.14 kg/m²     Physical Exam  Vitals and nursing note reviewed.   Constitutional:       General: She is awake.      Appearance: Normal appearance. She is well-developed and well-groomed. She is obese.   HENT:      Head: Normocephalic and atraumatic.      Right Ear: Tympanic membrane, ear canal and external ear normal.      Left Ear: Tympanic membrane, ear canal and external ear normal.      Nose: Nose normal.      Mouth/Throat:      Mouth: Mucous membranes are moist.      Pharynx: Oropharynx is clear.   Eyes:      Extraocular Movements: Extraocular movements intact.      Conjunctiva/sclera: Conjunctivae normal.      Pupils: Pupils are equal, round, and reactive to light.   Cardiovascular:      Rate and Rhythm: Normal rate and regular rhythm.      Pulses: Normal pulses.      Heart sounds: Normal heart sounds.   Pulmonary:      Effort: Pulmonary effort is normal.      Breath sounds: Normal breath sounds.   Abdominal:      General: Abdomen is flat. Bowel sounds are normal.      Palpations: Abdomen is soft.   Musculoskeletal:         General: Normal range of motion.      Cervical back: Normal range of motion and neck supple.   Skin:     General: Skin is " warm and dry.      Capillary Refill: Capillary refill takes less than 2 seconds.   Neurological:      General: No focal deficit present.      Mental Status: She is alert and oriented to person, place, and time. Mental status is at baseline.   Psychiatric:         Mood and Affect: Mood normal.         Behavior: Behavior normal. Behavior is cooperative.         Thought Content: Thought content normal.         Judgment: Judgment normal.       Procedures     Documentation Only on 11/29/2022   Component Date Value Ref Range Status    PAP Recommendation External 06/01/2022 Pap in 3 years   Final    Pap 06/01/2022 Negative for intraephithelial lesion or malignancy  Negative for intraephithelial lesion or malignancy, Other Final   Documentation Only on 11/04/2022   Component Date Value Ref Range Status    PAP Recommendation External 08/06/2019 Pap in 3 years   Final    Pap 08/06/2019 Negative for intraephithelial lesion or malignancy  Negative for intraephithelial lesion or malignancy, Other Final       X-Ray Wrist Complete Left  Narrative: EXAMINATION:  XR WRIST COMPLETE 3 VIEWS LEFT    CLINICAL HISTORY:  Pain in left wrist    COMPARISON:  None.    FINDINGS:  No acute displaced fractures or dislocations.    Joint spaces preserved.    Ulnar minus variance.    No blastic or lytic lesions.    Soft tissues within normal limits.  Impression: No acute osseous abnormality.    Electronically signed by: Soren Khanna  Date:    05/25/2022  Time:    13:12       Assessment:     1. Attention deficit hyperactivity disorder (ADHD), combined type    2. Class 1 obesity due to excess calories without serious comorbidity with body mass index (BMI) of 31.0 to 31.9 in adult      Plan:   I am having Yue Hickman maintain her etonogestreL-ethinyl estradioL, pantoprazole, GAVISCON EXTRA STRENGTH, famotidine, amoxicillin-clavulanate 875-125mg, dextroamphetamine-amphetamine, dextroamphetamine-amphetamine, and  dextroamphetamine-amphetamine.  Problem List Items Addressed This Visit       Class 1 obesity due to excess calories without serious comorbidity with body mass index (BMI) of 31.0 to 31.9 in adult (Chronic)    ADHD (attention deficit hyperactivity disorder) - Primary (Chronic)    Relevant Medications    dextroamphetamine-amphetamine 30 mg Tab    dextroamphetamine-amphetamine 30 mg Tab (Start on 2/17/2023)    dextroamphetamine-amphetamine 30 mg Tab (Start on 3/19/2023)     No follow-ups on file.    Yue was seen today for medication refill.    Diagnoses and all orders for this visit:    Attention deficit hyperactivity disorder (ADHD), combined type  -     Discontinue: dextroamphetamine-amphetamine 30 mg Tab; Take 1 tablet (30 mg total) by mouth 2 (two) times daily.  -     Discontinue: dextroamphetamine-amphetamine 30 mg Tab; Take 1 tablet (30 mg total) by mouth 2 (two) times daily.  -     Discontinue: dextroamphetamine-amphetamine 30 mg Tab; Take 1 tablet (30 mg total) by mouth 2 (two) times daily.  -     dextroamphetamine-amphetamine 30 mg Tab; Take 1 tablet (30 mg total) by mouth 2 (two) times daily.  -     dextroamphetamine-amphetamine 30 mg Tab; Take 1 tablet (30 mg total) by mouth 2 (two) times daily.  -     dextroamphetamine-amphetamine 30 mg Tab; Take 1 tablet (30 mg total) by mouth 2 (two) times daily.   Continue current prescription medications. Refills as needed   Condition/Symptoms controlled/stable   Surveillance labs ordered as needed, or reviewed in visit.   RTC 3 months (as scheduled) or PRN    Class 1 obesity due to excess calories without serious comorbidity with body mass index (BMI) of 31.0 to 31.9 in adult  Weight los efforts encouraged    Medications Ordered This Encounter   Medications    dextroamphetamine-amphetamine 30 mg Tab     Sig: Take 1 tablet (30 mg total) by mouth 2 (two) times daily.     Dispense:  60 tablet     Refill:  0    dextroamphetamine-amphetamine 30 mg Tab     Sig: Take 1  tablet (30 mg total) by mouth 2 (two) times daily.     Dispense:  60 tablet     Refill:  0    dextroamphetamine-amphetamine 30 mg Tab     Sig: Take 1 tablet (30 mg total) by mouth 2 (two) times daily.     Dispense:  60 tablet     Refill:  0     [unfilled]  No orders of the defined types were placed in this encounter.      Medication List with Changes/Refills   New Medications    DEXTROAMPHETAMINE-AMPHETAMINE 30 MG TAB    Take 1 tablet (30 mg total) by mouth 2 (two) times daily.    DEXTROAMPHETAMINE-AMPHETAMINE 30 MG TAB    Take 1 tablet (30 mg total) by mouth 2 (two) times daily.   Current Medications    AMOXICILLIN-CLAVULANATE 875-125MG (AUGMENTIN) 875-125 MG PER TABLET    Take 1 tablet by mouth 2 (two) times daily.    ETONOGESTREL-ETHINYL ESTRADIOL (NUVARING) 0.12-0.015 MG/24 HR VAGINAL RING    Place vaginally.    FAMOTIDINE (PEPCID) 40 MG TABLET    Take 1 tablet (40 mg total) by mouth once daily.    GAVISCON EXTRA STRENGTH 160-105 MG CHEW    Take 2 tablets by mouth 4 (four) times daily as needed.    PANTOPRAZOLE (PROTONIX) 40 MG TABLET    Take 40 mg by mouth once daily.   Changed and/or Refilled Medications    Modified Medication Previous Medication    DEXTROAMPHETAMINE-AMPHETAMINE 30 MG TAB dextroamphetamine-amphetamine 30 mg Tab       Take 1 tablet (30 mg total) by mouth 2 (two) times daily.    Take 1 tablet (30 mg total) by mouth 2 (two) times daily.      Medication List with Changes/Refills   New Medications    DEXTROAMPHETAMINE-AMPHETAMINE 30 MG TAB    Take 1 tablet (30 mg total) by mouth 2 (two) times daily.       Start Date: 2/17/2023 End Date: 3/19/2023    DEXTROAMPHETAMINE-AMPHETAMINE 30 MG TAB    Take 1 tablet (30 mg total) by mouth 2 (two) times daily.       Start Date: 3/19/2023 End Date: 4/18/2023   Current Medications    AMOXICILLIN-CLAVULANATE 875-125MG (AUGMENTIN) 875-125 MG PER TABLET    Take 1 tablet by mouth 2 (two) times daily.       Start Date: 1/13/2023 End Date: --    ETONOGESTREL-ETHINYL  ESTRADIOL (NUVARING) 0.12-0.015 MG/24 HR VAGINAL RING    Place vaginally.       Start Date: 4/27/2022 End Date: --    FAMOTIDINE (PEPCID) 40 MG TABLET    Take 1 tablet (40 mg total) by mouth once daily.       Start Date: 10/26/2022End Date: 4/24/2023    GAVISCON EXTRA STRENGTH 160-105 MG CHEW    Take 2 tablets by mouth 4 (four) times daily as needed.       Start Date: 5/4/2022  End Date: --    PANTOPRAZOLE (PROTONIX) 40 MG TABLET    Take 40 mg by mouth once daily.       Start Date: 4/14/2022 End Date: --   Changed and/or Refilled Medications    Modified Medication Previous Medication    DEXTROAMPHETAMINE-AMPHETAMINE 30 MG TAB dextroamphetamine-amphetamine 30 mg Tab       Take 1 tablet (30 mg total) by mouth 2 (two) times daily.    Take 1 tablet (30 mg total) by mouth 2 (two) times daily.       Start Date: 1/18/2023 End Date: 2/17/2023    Start Date: 12/25/2022End Date: 1/18/2023             No

## 2023-05-16 ENCOUNTER — OFFICE VISIT (OUTPATIENT)
Dept: FAMILY MEDICINE | Facility: CLINIC | Age: 28
End: 2023-05-16
Payer: COMMERCIAL

## 2023-05-16 VITALS
HEIGHT: 59 IN | DIASTOLIC BLOOD PRESSURE: 77 MMHG | OXYGEN SATURATION: 99 % | TEMPERATURE: 98 F | BODY MASS INDEX: 29.86 KG/M2 | HEART RATE: 98 BPM | WEIGHT: 148.13 LBS | SYSTOLIC BLOOD PRESSURE: 120 MMHG

## 2023-05-16 DIAGNOSIS — F90.9 ATTENTION DEFICIT HYPERACTIVITY DISORDER (ADHD), UNSPECIFIED ADHD TYPE: Primary | Chronic | ICD-10-CM

## 2023-05-16 DIAGNOSIS — F90.2 ATTENTION DEFICIT HYPERACTIVITY DISORDER (ADHD), COMBINED TYPE: Chronic | ICD-10-CM

## 2023-05-16 PROCEDURE — 3008F PR BODY MASS INDEX (BMI) DOCUMENTED: ICD-10-PCS | Mod: CPTII,,, | Performed by: NURSE PRACTITIONER

## 2023-05-16 PROCEDURE — 3008F BODY MASS INDEX DOCD: CPT | Mod: CPTII,,, | Performed by: NURSE PRACTITIONER

## 2023-05-16 PROCEDURE — 3078F DIAST BP <80 MM HG: CPT | Mod: CPTII,,, | Performed by: NURSE PRACTITIONER

## 2023-05-16 PROCEDURE — 3078F PR MOST RECENT DIASTOLIC BLOOD PRESSURE < 80 MM HG: ICD-10-PCS | Mod: CPTII,,, | Performed by: NURSE PRACTITIONER

## 2023-05-16 PROCEDURE — 1159F MED LIST DOCD IN RCRD: CPT | Mod: CPTII,,, | Performed by: NURSE PRACTITIONER

## 2023-05-16 PROCEDURE — 3074F SYST BP LT 130 MM HG: CPT | Mod: CPTII,,, | Performed by: NURSE PRACTITIONER

## 2023-05-16 PROCEDURE — 99213 PR OFFICE/OUTPT VISIT, EST, LEVL III, 20-29 MIN: ICD-10-PCS | Mod: ,,, | Performed by: NURSE PRACTITIONER

## 2023-05-16 PROCEDURE — 1160F RVW MEDS BY RX/DR IN RCRD: CPT | Mod: CPTII,,, | Performed by: NURSE PRACTITIONER

## 2023-05-16 PROCEDURE — 1159F PR MEDICATION LIST DOCUMENTED IN MEDICAL RECORD: ICD-10-PCS | Mod: CPTII,,, | Performed by: NURSE PRACTITIONER

## 2023-05-16 PROCEDURE — 99213 OFFICE O/P EST LOW 20 MIN: CPT | Mod: ,,, | Performed by: NURSE PRACTITIONER

## 2023-05-16 PROCEDURE — 1160F PR REVIEW ALL MEDS BY PRESCRIBER/CLIN PHARMACIST DOCUMENTED: ICD-10-PCS | Mod: CPTII,,, | Performed by: NURSE PRACTITIONER

## 2023-05-16 PROCEDURE — 3074F PR MOST RECENT SYSTOLIC BLOOD PRESSURE < 130 MM HG: ICD-10-PCS | Mod: CPTII,,, | Performed by: NURSE PRACTITIONER

## 2023-05-16 RX ORDER — DEXTROAMPHETAMINE SACCHARATE, AMPHETAMINE ASPARTATE, DEXTROAMPHETAMINE SULFATE AND AMPHETAMINE SULFATE 7.5; 7.5; 7.5; 7.5 MG/1; MG/1; MG/1; MG/1
1 TABLET ORAL 2 TIMES DAILY
Qty: 60 TABLET | Refills: 0 | Status: SHIPPED | OUTPATIENT
Start: 2023-05-16 | End: 2023-06-07 | Stop reason: SDUPTHER

## 2023-05-16 NOTE — ASSESSMENT & PLAN NOTE
Stable  Currently taking Aderrall 30 mg po BID;  reviewed; Rx sent  Instructed to continue to monitor symptoms  Report to ED for any CP, SOB, and/or worsening symptoms  Keep appt with PCP for follow up  Pt is agreeable to plan and verbalizes understanding

## 2023-05-31 ENCOUNTER — TELEPHONE (OUTPATIENT)
Dept: FAMILY MEDICINE | Facility: CLINIC | Age: 28
End: 2023-05-31
Payer: COMMERCIAL

## 2023-05-31 ENCOUNTER — PATIENT MESSAGE (OUTPATIENT)
Dept: FAMILY MEDICINE | Facility: CLINIC | Age: 28
End: 2023-05-31
Payer: COMMERCIAL

## 2023-05-31 DIAGNOSIS — Z00.00 WELLNESS EXAMINATION: ICD-10-CM

## 2023-05-31 DIAGNOSIS — Z13.220 ENCOUNTER FOR LIPID SCREENING FOR CARDIOVASCULAR DISEASE: ICD-10-CM

## 2023-05-31 DIAGNOSIS — K21.9 GASTROESOPHAGEAL REFLUX DISEASE WITHOUT ESOPHAGITIS: Chronic | ICD-10-CM

## 2023-05-31 DIAGNOSIS — Z13.6 ENCOUNTER FOR LIPID SCREENING FOR CARDIOVASCULAR DISEASE: ICD-10-CM

## 2023-05-31 DIAGNOSIS — F90.9 ATTENTION DEFICIT HYPERACTIVITY DISORDER (ADHD), UNSPECIFIED ADHD TYPE: Primary | Chronic | ICD-10-CM

## 2023-05-31 NOTE — TELEPHONE ENCOUNTER
Spoke with pt  She stated that she has not had lab work done in over a year and would like labs drawn  Pt has an appt to establish care with you on 06.07  Former [facility] pt  Please advise  Thanks

## 2023-05-31 NOTE — TELEPHONE ENCOUNTER
----- Message from Kimmie Olivo sent at 5/31/2023 10:09 AM CDT -----  Regarding: lab orders  .Type:  Needs Medical Advice    Who Called: Pt  Symptoms (please be specific):    How long has patient had these symptoms:    Pharmacy name and phone #:    Would the patient rather a call back or a response via [facility]? Call back  Best Call Back Number: [phone]  Additional Information: Requesting lab orders. Pt stated that when she saw the NP orders were supposed to be put in but nothing is in pts chart.

## 2023-05-31 NOTE — TELEPHONE ENCOUNTER
I have signed for the following orders AND/OR meds. Please notify the patient and ask the patient to schedule the testing and/or information about any medications that were sent.         Orders Placed This Encounter   Procedures    Drug Screen, Urine     Standing Status:   Future     Standing Expiration Date:   7/29/2024     Order Specific Question:   Specimen Source     Answer:   Urine    Comprehensive Metabolic Panel     Standing Status:   Future     Standing Expiration Date:   5/31/2024    Lipid Panel     Standing Status:   Future     Standing Expiration Date:   5/31/2024    CBC Auto Differential     Standing Status:   Future     Standing Expiration Date:   5/31/2024    Urinalysis     Standing Status:   Future     Standing Expiration Date:   5/31/2024    TSH     Standing Status:   Future     Standing Expiration Date:   5/31/2024

## 2023-06-07 ENCOUNTER — LAB VISIT (OUTPATIENT)
Dept: LAB | Facility: HOSPITAL | Age: 28
End: 2023-06-07
Attending: INTERNAL MEDICINE
Payer: COMMERCIAL

## 2023-06-07 ENCOUNTER — OFFICE VISIT (OUTPATIENT)
Dept: FAMILY MEDICINE | Facility: CLINIC | Age: 28
End: 2023-06-07
Payer: COMMERCIAL

## 2023-06-07 VITALS
WEIGHT: 144.38 LBS | OXYGEN SATURATION: 99 % | SYSTOLIC BLOOD PRESSURE: 122 MMHG | HEART RATE: 98 BPM | TEMPERATURE: 99 F | DIASTOLIC BLOOD PRESSURE: 80 MMHG | RESPIRATION RATE: 16 BRPM | HEIGHT: 59 IN | BODY MASS INDEX: 29.11 KG/M2

## 2023-06-07 DIAGNOSIS — Z13.220 ENCOUNTER FOR LIPID SCREENING FOR CARDIOVASCULAR DISEASE: ICD-10-CM

## 2023-06-07 DIAGNOSIS — K21.9 GASTROESOPHAGEAL REFLUX DISEASE WITHOUT ESOPHAGITIS: ICD-10-CM

## 2023-06-07 DIAGNOSIS — Q85.01 NEUROFIBROMATOSIS, TYPE 1: ICD-10-CM

## 2023-06-07 DIAGNOSIS — Z13.6 ENCOUNTER FOR LIPID SCREENING FOR CARDIOVASCULAR DISEASE: ICD-10-CM

## 2023-06-07 DIAGNOSIS — K21.9 GASTROESOPHAGEAL REFLUX DISEASE WITHOUT ESOPHAGITIS: Chronic | ICD-10-CM

## 2023-06-07 DIAGNOSIS — Z00.00 WELLNESS EXAMINATION: Primary | ICD-10-CM

## 2023-06-07 DIAGNOSIS — F90.2 ATTENTION DEFICIT HYPERACTIVITY DISORDER (ADHD), COMBINED TYPE: Chronic | ICD-10-CM

## 2023-06-07 DIAGNOSIS — R45.89 DEPRESSED MOOD: ICD-10-CM

## 2023-06-07 DIAGNOSIS — Z00.00 WELLNESS EXAMINATION: ICD-10-CM

## 2023-06-07 LAB
ALBUMIN SERPL-MCNC: 4.3 G/DL (ref 3.5–5)
ALBUMIN/GLOB SERPL: 1.1 RATIO (ref 1.1–2)
ALP SERPL-CCNC: 50 UNIT/L (ref 40–150)
ALT SERPL-CCNC: 10 UNIT/L (ref 0–55)
AMPHET UR QL SCN: POSITIVE
ANISOCYTOSIS BLD QL SMEAR: ABNORMAL
AST SERPL-CCNC: 13 UNIT/L (ref 5–34)
BARBITURATE SCN PRESENT UR: NEGATIVE
BASOPHILS # BLD AUTO: 0.03 X10(3)/MCL
BASOPHILS NFR BLD AUTO: 0.4 %
BENZODIAZ UR QL SCN: NEGATIVE
BILIRUBIN DIRECT+TOT PNL SERPL-MCNC: 0.9 MG/DL
BUN SERPL-MCNC: 11.8 MG/DL (ref 7–18.7)
CALCIUM SERPL-MCNC: 10.3 MG/DL (ref 8.4–10.2)
CANNABINOIDS UR QL SCN: POSITIVE
CHLORIDE SERPL-SCNC: 107 MMOL/L (ref 98–107)
CHOLEST SERPL-MCNC: 196 MG/DL
CHOLEST/HDLC SERPL: 3 {RATIO} (ref 0–5)
CO2 SERPL-SCNC: 25 MMOL/L (ref 22–29)
COCAINE UR QL SCN: NEGATIVE
CREAT SERPL-MCNC: 0.61 MG/DL (ref 0.55–1.02)
EOSINOPHIL # BLD AUTO: 0.04 X10(3)/MCL (ref 0–0.9)
EOSINOPHIL NFR BLD AUTO: 0.6 %
ERYTHROCYTE [DISTWIDTH] IN BLOOD BY AUTOMATED COUNT: 18.2 % (ref 11.5–17)
FENTANYL UR QL SCN: NEGATIVE
GFR SERPLBLD CREATININE-BSD FMLA CKD-EPI: >60 MLS/MIN/1.73/M2
GLOBULIN SER-MCNC: 3.8 GM/DL (ref 2.4–3.5)
GLUCOSE SERPL-MCNC: 84 MG/DL (ref 74–100)
HCT VFR BLD AUTO: 37.5 % (ref 37–47)
HDLC SERPL-MCNC: 66 MG/DL (ref 35–60)
HGB BLD-MCNC: 12.1 G/DL (ref 12–16)
HYPOCHROMIA BLD QL SMEAR: ABNORMAL
IMM GRANULOCYTES # BLD AUTO: 0.01 X10(3)/MCL (ref 0–0.04)
IMM GRANULOCYTES NFR BLD AUTO: 0.1 %
LDLC SERPL CALC-MCNC: 121 MG/DL (ref 50–140)
LYMPHOCYTES # BLD AUTO: 1.87 X10(3)/MCL (ref 0.6–4.6)
LYMPHOCYTES NFR BLD AUTO: 27.5 %
MCH RBC QN AUTO: 23.3 PG (ref 27–31)
MCHC RBC AUTO-ENTMCNC: 32.3 G/DL (ref 33–36)
MCV RBC AUTO: 72.1 FL (ref 80–94)
MDMA UR QL SCN: NEGATIVE
MICROCYTES BLD QL SMEAR: ABNORMAL
MONOCYTES # BLD AUTO: 0.35 X10(3)/MCL (ref 0.1–1.3)
MONOCYTES NFR BLD AUTO: 5.2 %
NEUTROPHILS # BLD AUTO: 4.49 X10(3)/MCL (ref 2.1–9.2)
NEUTROPHILS NFR BLD AUTO: 66.2 %
NRBC BLD AUTO-RTO: 0 %
OPIATES UR QL SCN: NEGATIVE
PCP UR QL: NEGATIVE
PH UR: 7.5 [PH] (ref 3–11)
PLATELET # BLD AUTO: 312 X10(3)/MCL (ref 130–400)
PLATELET # BLD EST: ADEQUATE 10*3/UL
PMV BLD AUTO: 9.3 FL (ref 7.4–10.4)
POTASSIUM SERPL-SCNC: 4.1 MMOL/L (ref 3.5–5.1)
PROT SERPL-MCNC: 8.1 GM/DL (ref 6.4–8.3)
RBC # BLD AUTO: 5.2 X10(6)/MCL (ref 4.2–5.4)
RBC MORPH BLD: ABNORMAL
SODIUM SERPL-SCNC: 140 MMOL/L (ref 136–145)
TRIGL SERPL-MCNC: 43 MG/DL (ref 37–140)
TSH SERPL-ACNC: 0.87 UIU/ML (ref 0.35–4.94)
VLDLC SERPL CALC-MCNC: 9 MG/DL
WBC # SPEC AUTO: 6.79 X10(3)/MCL (ref 4.5–11.5)

## 2023-06-07 PROCEDURE — 99395 PREV VISIT EST AGE 18-39: CPT | Mod: ,,, | Performed by: INTERNAL MEDICINE

## 2023-06-07 PROCEDURE — 3008F BODY MASS INDEX DOCD: CPT | Mod: CPTII,,, | Performed by: INTERNAL MEDICINE

## 2023-06-07 PROCEDURE — 3079F PR MOST RECENT DIASTOLIC BLOOD PRESSURE 80-89 MM HG: ICD-10-PCS | Mod: CPTII,,, | Performed by: INTERNAL MEDICINE

## 2023-06-07 PROCEDURE — 3074F PR MOST RECENT SYSTOLIC BLOOD PRESSURE < 130 MM HG: ICD-10-PCS | Mod: CPTII,,, | Performed by: INTERNAL MEDICINE

## 2023-06-07 PROCEDURE — 1160F PR REVIEW ALL MEDS BY PRESCRIBER/CLIN PHARMACIST DOCUMENTED: ICD-10-PCS | Mod: CPTII,,, | Performed by: INTERNAL MEDICINE

## 2023-06-07 PROCEDURE — 3008F PR BODY MASS INDEX (BMI) DOCUMENTED: ICD-10-PCS | Mod: CPTII,,, | Performed by: INTERNAL MEDICINE

## 2023-06-07 PROCEDURE — 85025 COMPLETE CBC W/AUTO DIFF WBC: CPT

## 2023-06-07 PROCEDURE — 99395 PR PREVENTIVE VISIT,EST,18-39: ICD-10-PCS | Mod: ,,, | Performed by: INTERNAL MEDICINE

## 2023-06-07 PROCEDURE — 1159F PR MEDICATION LIST DOCUMENTED IN MEDICAL RECORD: ICD-10-PCS | Mod: CPTII,,, | Performed by: INTERNAL MEDICINE

## 2023-06-07 PROCEDURE — 3079F DIAST BP 80-89 MM HG: CPT | Mod: CPTII,,, | Performed by: INTERNAL MEDICINE

## 2023-06-07 PROCEDURE — 3074F SYST BP LT 130 MM HG: CPT | Mod: CPTII,,, | Performed by: INTERNAL MEDICINE

## 2023-06-07 PROCEDURE — 36415 COLL VENOUS BLD VENIPUNCTURE: CPT

## 2023-06-07 PROCEDURE — 84443 ASSAY THYROID STIM HORMONE: CPT

## 2023-06-07 PROCEDURE — 1159F MED LIST DOCD IN RCRD: CPT | Mod: CPTII,,, | Performed by: INTERNAL MEDICINE

## 2023-06-07 PROCEDURE — 80061 LIPID PANEL: CPT

## 2023-06-07 PROCEDURE — 80053 COMPREHEN METABOLIC PANEL: CPT

## 2023-06-07 PROCEDURE — 1160F RVW MEDS BY RX/DR IN RCRD: CPT | Mod: CPTII,,, | Performed by: INTERNAL MEDICINE

## 2023-06-07 RX ORDER — DEXTROAMPHETAMINE SACCHARATE, AMPHETAMINE ASPARTATE, DEXTROAMPHETAMINE SULFATE AND AMPHETAMINE SULFATE 7.5; 7.5; 7.5; 7.5 MG/1; MG/1; MG/1; MG/1
1 TABLET ORAL 2 TIMES DAILY
Qty: 60 TABLET | Refills: 0 | Status: SHIPPED | OUTPATIENT
Start: 2023-06-07 | End: 2023-07-07

## 2023-06-07 RX ORDER — DEXTROAMPHETAMINE SACCHARATE, AMPHETAMINE ASPARTATE, DEXTROAMPHETAMINE SULFATE AND AMPHETAMINE SULFATE 7.5; 7.5; 7.5; 7.5 MG/1; MG/1; MG/1; MG/1
30 TABLET ORAL 2 TIMES DAILY
Qty: 60 TABLET | Refills: 0 | Status: SHIPPED | OUTPATIENT
Start: 2023-06-07 | End: 2023-09-06 | Stop reason: SDUPTHER

## 2023-06-07 RX ORDER — FAMOTIDINE 40 MG/1
40 TABLET, FILM COATED ORAL DAILY
Qty: 90 TABLET | Refills: 1 | Status: SHIPPED | OUTPATIENT
Start: 2023-06-07 | End: 2023-11-01

## 2023-06-07 NOTE — PROGRESS NOTES
Subjective:      Patient ID: Yue Hickman is a 27 y.o. female.    Chief Complaint: Annual Exam    HPI  New Patient Establish Care, Wellness   Previous PCP Dr. Juan Butts    Patient is a 27 year old F; she is single, no children. 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 She describes herself as working very hard, trying to accomplish everything she can, but not really creating much time to unwind/relax. She says she knows healthy habits- has tried workout, sleep etc before and says she is trying to get back on track with it but has found it more difficult; she is emotional in clinic was crying describing challenges she faces with work, saving money and trying to go back to school to ultimately change her career.    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   HENT:  Negative for congestion, sinus pressure and sore throat.    Respiratory:  Negative for cough and shortness of breath.    Cardiovascular:  Negative for chest pain and palpitations.   Gastrointestinal:  Negative for abdominal pain and nausea.   Skin:  Negative for rash.     Objective:     Vitals:    06/07/23 0908   BP: 122/80   BP Location: Left arm   Patient Position: Sitting   BP Method: Large (Automatic)   Pulse: 98   Resp: 16   Temp: 98.6 °F (37 °C)   TempSrc: Temporal   SpO2: 99%   Weight: 65.5  "kg (144 lb 6.4 oz)   Height: 4' 11" (1.499 m)     Physical Exam  Vitals and nursing note reviewed.   Constitutional:       General: She is not in acute distress.     Appearance: She is well-developed. She is not diaphoretic.   HENT:      Head: Normocephalic and atraumatic.      Right Ear: Tympanic membrane normal.      Left Ear: Tympanic membrane normal.      Nose: Nose normal.      Mouth/Throat:      Pharynx: No oropharyngeal exudate.   Eyes:      General:         Right eye: No discharge.         Left eye: No discharge.      Conjunctiva/sclera: Conjunctivae normal.      Pupils: Pupils are equal, round, and reactive to light.   Neck:      Thyroid: No thyromegaly.   Cardiovascular:      Rate and Rhythm: Normal rate and regular rhythm.      Heart sounds: Normal heart sounds. No murmur heard.  Pulmonary:      Effort: Pulmonary effort is normal. No respiratory distress.      Breath sounds: Normal breath sounds. No wheezing or rales.   Abdominal:      General: There is no distension.      Palpations: Abdomen is soft.      Tenderness: There is no abdominal tenderness.   Musculoskeletal:      Cervical back: Normal range of motion and neck supple.   Lymphadenopathy:      Cervical: No cervical adenopathy.   Skin:     General: Skin is warm and dry.      Comments: Several cafe au lait lesions noted to bilateral LE   Neurological:      Mental Status: She is alert and oriented to person, place, and time.   Psychiatric:         Mood and Affect: Mood normal.         Behavior: Behavior normal.     Assessment/Plan:     1. Wellness examination  -     Comprehensive Metabolic Panel; Future; Expected date: 06/07/2023  -     Lipid Panel; Future; Expected date: 06/07/2023  -     CBC Auto Differential; Future; Expected date: 06/07/2023  -     TSH; Future; Expected date: 06/07/2023  Fasting labs reviewed today  2. Gastroesophageal reflux disease without esophagitis  -     famotidine (PEPCID) 40 MG tablet; Take 1 tablet (40 mg total) by " mouth once daily.  Dispense: 90 tablet; Refill: 1  -     Comprehensive Metabolic Panel; Future; Expected date: 06/07/2023  -     Lipid Panel; Future; Expected date: 06/07/2023  -     CBC Auto Differential; Future; Expected date: 06/07/2023  -     TSH; Future; Expected date: 06/07/2023  Increase in reflux sx  Trigger foods reviewed  No red flags today  Restart famotidine daily  If no improvement after about 2 weeks, then we will start PPI again and discuss treatment  3. Attention deficit hyperactivity disorder (ADHD), combined type  -     dextroamphetamine-amphetamine 30 mg Tab; Take 1 tablet (30 mg total) by mouth 2 (two) times daily.  Dispense: 60 tablet; Refill: 0  -     Drug Screen, Urine  PMDP reviewed  Controlled substance agreement signed  Rx refill post dated x 3 months printed per patient request  Urine drug screen now  4. Encounter for lipid screening for cardiovascular disease  -     Comprehensive Metabolic Panel; Future; Expected date: 06/07/2023  -     Lipid Panel; Future; Expected date: 06/07/2023  -     CBC Auto Differential; Future; Expected date: 06/07/2023  -     TSH; Future; Expected date: 06/07/2023    5. Depressed mood  Patient with depressed mood  Most of our visit was discussed regarding how she is working herself too hard without a break, taking stimulant via caffeine or medication to stay hard working but then is having trouble to relax and creating space; I discussed major lifestyle modifications and also recommended counseling via [facility] if she has time as she is also describing grief from loss of her close friend  6. Neurofibromatosis, type 1  Discussed with patient, stable  Other orders  -     dextroamphetamine-amphetamine 30 mg Tab; Take 1 tablet (30 mg total) by mouth 2 (two) times a day.  Dispense: 60 tablet; Refill: 0  -     dextroamphetamine-amphetamine 30 mg Tab; Take 1 tablet (30 mg total) by mouth 2 (two) times a day.  Dispense: 60 tablet; Refill: 0       Follow up in about 3  months (around 9/7/2023) for Follow Up - Controlled Med ADHD telemed ok .    This includes face to face time and non-face to face time preparing to see the patient (eg, review of tests), obtaining and/or reviewing separately obtained history, documenting clinical information in the electronic or other health record, independently interpreting results and communicating results to the patient/family/caregiver, or care coordinator.

## 2023-06-08 ENCOUNTER — PATIENT MESSAGE (OUTPATIENT)
Dept: FAMILY MEDICINE | Facility: CLINIC | Age: 28
End: 2023-06-08
Payer: COMMERCIAL

## 2023-06-08 NOTE — TELEPHONE ENCOUNTER
Good morning,   I have questions/ concerns about the marijuana detected.  If I have a medical card for it to treat my anxiety and depression from the grief of losing my friend. I was wondering if this would still be an issue with your being able to further prescribe me.  I wasn't aware that it would be an issue and I need to mention it before the test.     Thank you

## 2023-07-05 ENCOUNTER — PATIENT MESSAGE (OUTPATIENT)
Dept: RESEARCH | Facility: HOSPITAL | Age: 28
End: 2023-07-05
Payer: COMMERCIAL

## 2023-07-11 ENCOUNTER — TELEPHONE (OUTPATIENT)
Dept: FAMILY MEDICINE | Facility: CLINIC | Age: 28
End: 2023-07-11
Payer: COMMERCIAL

## 2023-07-11 NOTE — TELEPHONE ENCOUNTER
----- Message from Misa Anguiano sent at 7/10/2023  3:20 PM CDT -----  Regarding: call back  .Type:  Needs Medical Advice    Who Called: pt  Symptoms (please be specific): vomiting    How long has patient had these symptoms:  1 hour  Pharmacy name and phone #:  [id number] - [place]    Phone: [phone]  Fax: [phone]  Would the patient rather a call back or a response via [facility]? Call back  Best Call Back Number: [phone]  Additional Information: pt is requesting a call back about meds

## 2023-09-06 ENCOUNTER — OFFICE VISIT (OUTPATIENT)
Dept: FAMILY MEDICINE | Facility: CLINIC | Age: 28
End: 2023-09-06
Payer: COMMERCIAL

## 2023-09-06 VITALS
BODY MASS INDEX: 27.38 KG/M2 | WEIGHT: 135.81 LBS | RESPIRATION RATE: 16 BRPM | OXYGEN SATURATION: 99 % | SYSTOLIC BLOOD PRESSURE: 114 MMHG | HEART RATE: 96 BPM | DIASTOLIC BLOOD PRESSURE: 80 MMHG | TEMPERATURE: 99 F | HEIGHT: 59 IN

## 2023-09-06 DIAGNOSIS — F90.9 ATTENTION DEFICIT HYPERACTIVITY DISORDER (ADHD), UNSPECIFIED ADHD TYPE: Primary | Chronic | ICD-10-CM

## 2023-09-06 PROCEDURE — 1159F PR MEDICATION LIST DOCUMENTED IN MEDICAL RECORD: ICD-10-PCS | Mod: CPTII,,, | Performed by: INTERNAL MEDICINE

## 2023-09-06 PROCEDURE — 99214 PR OFFICE/OUTPT VISIT, EST, LEVL IV, 30-39 MIN: ICD-10-PCS | Mod: ,,, | Performed by: INTERNAL MEDICINE

## 2023-09-06 PROCEDURE — 3079F PR MOST RECENT DIASTOLIC BLOOD PRESSURE 80-89 MM HG: ICD-10-PCS | Mod: CPTII,,, | Performed by: INTERNAL MEDICINE

## 2023-09-06 PROCEDURE — 3008F PR BODY MASS INDEX (BMI) DOCUMENTED: ICD-10-PCS | Mod: CPTII,,, | Performed by: INTERNAL MEDICINE

## 2023-09-06 PROCEDURE — 3008F BODY MASS INDEX DOCD: CPT | Mod: CPTII,,, | Performed by: INTERNAL MEDICINE

## 2023-09-06 PROCEDURE — 1159F MED LIST DOCD IN RCRD: CPT | Mod: CPTII,,, | Performed by: INTERNAL MEDICINE

## 2023-09-06 PROCEDURE — 3074F SYST BP LT 130 MM HG: CPT | Mod: CPTII,,, | Performed by: INTERNAL MEDICINE

## 2023-09-06 PROCEDURE — 3079F DIAST BP 80-89 MM HG: CPT | Mod: CPTII,,, | Performed by: INTERNAL MEDICINE

## 2023-09-06 PROCEDURE — 1160F RVW MEDS BY RX/DR IN RCRD: CPT | Mod: CPTII,,, | Performed by: INTERNAL MEDICINE

## 2023-09-06 PROCEDURE — 99214 OFFICE O/P EST MOD 30 MIN: CPT | Mod: ,,, | Performed by: INTERNAL MEDICINE

## 2023-09-06 PROCEDURE — 3074F PR MOST RECENT SYSTOLIC BLOOD PRESSURE < 130 MM HG: ICD-10-PCS | Mod: CPTII,,, | Performed by: INTERNAL MEDICINE

## 2023-09-06 PROCEDURE — 1160F PR REVIEW ALL MEDS BY PRESCRIBER/CLIN PHARMACIST DOCUMENTED: ICD-10-PCS | Mod: CPTII,,, | Performed by: INTERNAL MEDICINE

## 2023-09-06 RX ORDER — DEXTROAMPHETAMINE SACCHARATE, AMPHETAMINE ASPARTATE, DEXTROAMPHETAMINE SULFATE AND AMPHETAMINE SULFATE 7.5; 7.5; 7.5; 7.5 MG/1; MG/1; MG/1; MG/1
30 TABLET ORAL 2 TIMES DAILY
Qty: 60 TABLET | Refills: 0 | Status: SHIPPED | OUTPATIENT
Start: 2023-09-06 | End: 2023-10-19 | Stop reason: SDUPTHER

## 2023-09-06 RX ORDER — ONDANSETRON 4 MG/1
4 TABLET, ORALLY DISINTEGRATING ORAL EVERY 8 HOURS PRN
Qty: 12 TABLET | Refills: 2 | Status: SHIPPED | OUTPATIENT
Start: 2023-09-06 | End: 2023-11-20 | Stop reason: SDUPTHER

## 2023-09-06 NOTE — PROGRESS NOTES
"Subjective:      Patient ID: Yue Hickman is a 27 y.o. female.    Chief Complaint: ADHD    HPI  3 month follow up visit for controlled medication refill  Of note patient has medical Rx for marijuana use    she is single, no children. 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   HENT:  Negative for congestion, sinus pressure and sore throat.    Respiratory:  Negative for cough and shortness of breath.    Cardiovascular:  Negative for chest pain and palpitations.   Gastrointestinal:  Negative for abdominal pain and nausea.   Skin:  Negative for rash.       Objective:     Vitals:    09/06/23 1100   BP: 114/80   BP Location: Left arm   Patient Position: Sitting   BP Method: Large (Automatic)   Pulse: 96   Resp: 16   Temp: 98.6 °F (37 °C)   TempSrc: Temporal   SpO2: 99%   Weight: 61.6 kg (135 lb 12.8 oz)   Height: 4' 11" (1.499 m)     Physical Exam  Vitals and nursing note reviewed.   Constitutional:       General: She is not in acute distress.     Appearance: Normal appearance. She is not ill-appearing.   HENT:      Head: Normocephalic and atraumatic.   Neurological:      Mental Status: She is alert. "   Psychiatric:         Behavior: Behavior normal.       Assessment/Plan:     1. Attention deficit hyperactivity disorder (ADHD), unspecified ADHD type  Patient reports medication is working well for her overall  Denies increase in depression  She is hopeful to start working for herself in future, hopeful today   She has cut down on caffeine which has helped her, sleep is good, no SI/HI  PMDP reviewed  Patient requested printed Rx this month- provided  Will need to print monthly for her as risk of losing Rx is high if given all three printed and we cannot refill med electronically or duplicate again if lost/stolen  Other orders  -     ondansetron (ZOFRAN-ODT) 4 MG TbDL; Take 1 tablet (4 mg total) by mouth every 8 (eight) hours as needed (nausea).  Dispense: 12 tablet; Refill: 2  -     dextroamphetamine-amphetamine 30 mg Tab; Take 1 tablet (30 mg total) by mouth 2 (two) times a day.  Dispense: 60 tablet; Refill: 0       Follow up in about 3 months (around 12/6/2023) for Follow Up - Controlled Med ok to put for 1145 or with Jemma.    This includes face to face time and non-face to face time preparing to see the patient (eg, review of tests), obtaining and/or reviewing separately obtained history, documenting clinical information in the electronic or other health record, independently interpreting results and communicating results to the patient/family/caregiver, or care coordinator.

## 2023-10-19 ENCOUNTER — TELEPHONE (OUTPATIENT)
Dept: FAMILY MEDICINE | Facility: CLINIC | Age: 28
End: 2023-10-19
Payer: COMMERCIAL

## 2023-10-19 RX ORDER — DEXTROAMPHETAMINE SACCHARATE, AMPHETAMINE ASPARTATE, DEXTROAMPHETAMINE SULFATE AND AMPHETAMINE SULFATE 7.5; 7.5; 7.5; 7.5 MG/1; MG/1; MG/1; MG/1
30 TABLET ORAL 2 TIMES DAILY
Qty: 60 TABLET | Refills: 0 | Status: SHIPPED | OUTPATIENT
Start: 2023-10-19 | End: 2023-10-23 | Stop reason: SDUPTHER

## 2023-10-19 NOTE — TELEPHONE ENCOUNTER
----- Message from Amee Tarango sent at 10/19/2023  8:17 AM CDT -----  .Type:  RX Refill Request    Who Called: pt  Refill or New Rx:refill  RX Name and Strength:dextroamphetamine-amphetamine 30 mg Tab  How is the patient currently taking it? 2XDay  Is this a 30 day or 90 day RX:60  Preferred Pharm[facility] with phone number:[facility]   Local or Mail Order:local  Ordering Provider:  Would the patient rather a call back or a response via [facility]?   Best Call Back Number:[phone]  Additional Information: refill request

## 2023-10-19 NOTE — TELEPHONE ENCOUNTER
I have signed for the following orders AND/OR meds. Please notify the patient and ask the patient to schedule the testing and/or information about any medications that were sent.      Medications Ordered This Encounter   Medications    dextroamphetamine-amphetamine 30 mg Tab     Sig: Take 1 tablet (30 mg total) by mouth 2 (two) times a day.     Dispense:  60 tablet     Refill:  0     No orders of the defined types were placed in this encounter.

## 2023-10-23 ENCOUNTER — OFFICE VISIT (OUTPATIENT)
Dept: FAMILY MEDICINE | Facility: CLINIC | Age: 28
End: 2023-10-23
Payer: COMMERCIAL

## 2023-10-23 ENCOUNTER — TELEPHONE (OUTPATIENT)
Dept: FAMILY MEDICINE | Facility: CLINIC | Age: 28
End: 2023-10-23

## 2023-10-23 VITALS
HEART RATE: 78 BPM | SYSTOLIC BLOOD PRESSURE: 124 MMHG | HEIGHT: 59 IN | OXYGEN SATURATION: 98 % | WEIGHT: 132.63 LBS | DIASTOLIC BLOOD PRESSURE: 74 MMHG | BODY MASS INDEX: 26.74 KG/M2 | RESPIRATION RATE: 20 BRPM | TEMPERATURE: 97 F

## 2023-10-23 DIAGNOSIS — R45.89 DEPRESSED MOOD: Primary | ICD-10-CM

## 2023-10-23 DIAGNOSIS — J32.9 SINUSITIS, UNSPECIFIED CHRONICITY, UNSPECIFIED LOCATION: Primary | ICD-10-CM

## 2023-10-23 PROCEDURE — 3074F SYST BP LT 130 MM HG: CPT | Mod: CPTII,,, | Performed by: NURSE PRACTITIONER

## 2023-10-23 PROCEDURE — 1159F PR MEDICATION LIST DOCUMENTED IN MEDICAL RECORD: ICD-10-PCS | Mod: CPTII,,, | Performed by: NURSE PRACTITIONER

## 2023-10-23 PROCEDURE — 1159F MED LIST DOCD IN RCRD: CPT | Mod: CPTII,,, | Performed by: NURSE PRACTITIONER

## 2023-10-23 PROCEDURE — 1160F PR REVIEW ALL MEDS BY PRESCRIBER/CLIN PHARMACIST DOCUMENTED: ICD-10-PCS | Mod: CPTII,,, | Performed by: NURSE PRACTITIONER

## 2023-10-23 PROCEDURE — 99213 OFFICE O/P EST LOW 20 MIN: CPT | Mod: ,,, | Performed by: NURSE PRACTITIONER

## 2023-10-23 PROCEDURE — 3008F BODY MASS INDEX DOCD: CPT | Mod: CPTII,,, | Performed by: NURSE PRACTITIONER

## 2023-10-23 PROCEDURE — 1160F RVW MEDS BY RX/DR IN RCRD: CPT | Mod: CPTII,,, | Performed by: NURSE PRACTITIONER

## 2023-10-23 PROCEDURE — 3078F PR MOST RECENT DIASTOLIC BLOOD PRESSURE < 80 MM HG: ICD-10-PCS | Mod: CPTII,,, | Performed by: NURSE PRACTITIONER

## 2023-10-23 PROCEDURE — 3078F DIAST BP <80 MM HG: CPT | Mod: CPTII,,, | Performed by: NURSE PRACTITIONER

## 2023-10-23 PROCEDURE — 3008F PR BODY MASS INDEX (BMI) DOCUMENTED: ICD-10-PCS | Mod: CPTII,,, | Performed by: NURSE PRACTITIONER

## 2023-10-23 PROCEDURE — 3074F PR MOST RECENT SYSTOLIC BLOOD PRESSURE < 130 MM HG: ICD-10-PCS | Mod: CPTII,,, | Performed by: NURSE PRACTITIONER

## 2023-10-23 PROCEDURE — 99213 PR OFFICE/OUTPT VISIT, EST, LEVL III, 20-29 MIN: ICD-10-PCS | Mod: ,,, | Performed by: NURSE PRACTITIONER

## 2023-10-23 RX ORDER — IBUPROFEN 600 MG/1
600 TABLET ORAL
COMMUNITY
Start: 2023-10-15

## 2023-10-23 RX ORDER — FLUTICASONE PROPIONATE 50 MCG
2 SPRAY, SUSPENSION (ML) NASAL
COMMUNITY
Start: 2023-08-14 | End: 2023-12-06 | Stop reason: SDUPTHER

## 2023-10-23 RX ORDER — AMOXICILLIN AND CLAVULANATE POTASSIUM 875; 125 MG/1; MG/1
1 TABLET, FILM COATED ORAL EVERY 12 HOURS
Qty: 20 TABLET | Refills: 0 | Status: SHIPPED | OUTPATIENT
Start: 2023-10-23 | End: 2023-11-02

## 2023-10-23 RX ORDER — DEXTROAMPHETAMINE SACCHARATE, AMPHETAMINE ASPARTATE, DEXTROAMPHETAMINE SULFATE AND AMPHETAMINE SULFATE 7.5; 7.5; 7.5; 7.5 MG/1; MG/1; MG/1; MG/1
30 TABLET ORAL 2 TIMES DAILY
Qty: 60 TABLET | Refills: 0 | Status: SHIPPED | OUTPATIENT
Start: 2023-10-23 | End: 2023-11-20 | Stop reason: SDUPTHER

## 2023-10-23 RX ORDER — METHYLPREDNISOLONE 4 MG/1
TABLET ORAL
Qty: 21 EACH | Refills: 0 | Status: SHIPPED | OUTPATIENT
Start: 2023-10-23

## 2023-10-23 NOTE — TELEPHONE ENCOUNTER
I spoke with patient and she has already seen Jem[facility] today. I Advised her that her insurance will not pay for two visits in one day. Patient advised per Dr. Alfonso verbal order that patient will need a visit for the leave of absence approval. Patient is aware and scheduled to see Jemma on November 1st.     She is in agreement with referral to Dr. Adolph Camarillo's group, preferably a Female. Referral sent. [place]

## 2023-10-23 NOTE — TELEPHONE ENCOUNTER
I have signed for the following orders AND/OR meds. Please notify the patient and ask the patient to schedule the testing and/or information about any medications that were sent.         Orders Placed This Encounter   Procedures    Ambulatory referral/consult to Psychiatry     Standing Status:   Future     Standing Expiration Date:   11/23/2024     Referral Priority:   Routine     Referral Type:   Psychiatric     Referral Reason:   Specialty Services Required     Requested Specialty:   Psychiatry     Number of Visits Requested:   1    Ambulatory referral/consult to Psychiatry     Standing Status:   Future     Standing Expiration Date:   11/23/2024     Referral Priority:   Routine     Referral Type:   Psychiatric     Referral Reason:   Specialty Services Required     Requested Specialty:   Psychiatry     Number of Visits Requested:   1

## 2023-10-23 NOTE — ASSESSMENT & PLAN NOTE
Rx Augmentin  Rx Medrol Dose Robert  OTC anti-tussive prn  Instructed to continue to monitor symptoms  Report to ED for any CP, SOB, and/or worsening symptoms  Pt is agreeable to plan and verbalizes understanding

## 2023-10-23 NOTE — PROGRESS NOTES
Subjective:      Patient ID: Yue Hickman is a 27 y.o. Black or  female      Chief Complaint: Cough, Sneezing, Congestion       Past Medical History:   Diagnosis Date    ADHD (attention deficit hyperactivity disorder)     Esophagitis     GERD (gastroesophageal reflux disease)     Neurofibromatosis, type 1 6/7/2023        HPI  Cough  This is a new problem. Episode onset: 1-2 weeks. The cough is Productive of sputum. Associated symptoms include nasal congestion, postnasal drip and a sore throat. Pertinent negatives include no chest pain, chills, eye redness, fever or shortness of breath. Associated symptoms comments: sneezing. She has tried OTC cough suppressant for the symptoms. The treatment provided no relief.       Patient Care Team:  Rosie Alfonso MD as PCP - General (Internal Medicine)      Review of Systems   Constitutional: Negative.  Negative for appetite change, chills and fever.   HENT:  Positive for postnasal drip and sore throat.    Eyes: Negative.  Negative for discharge and redness.   Respiratory:  Positive for cough. Negative for shortness of breath.    Cardiovascular: Negative.  Negative for chest pain.   Gastrointestinal: Negative.    Endocrine: Negative.    Genitourinary: Negative.    Integumentary:  Negative.   Allergic/Immunologic: Negative.    Neurological: Negative.    Psychiatric/Behavioral: Negative.     All other systems reviewed and are negative.          Objective:      Vitals:    10/23/23 1033   BP: 124/74   Pulse: 78   Resp: 20   Temp: 97.4 °F (36.3 °C)      Body mass index is 26.78 kg/m².     Physical Exam  Vitals reviewed.   Constitutional:       Appearance: Normal appearance.   HENT:      Head: Normocephalic.      Nose: Congestion present.      Mouth/Throat:      Mouth: Mucous membranes are moist.      Comments: +PND  Eyes:      Conjunctiva/sclera: Conjunctivae normal.      Pupils: Pupils are equal, round, and reactive to light.   Cardiovascular:      Rate and  Rhythm: Normal rate and regular rhythm.   Pulmonary:      Effort: Pulmonary effort is normal. No respiratory distress.      Breath sounds: Normal breath sounds.   Musculoskeletal:         General: Normal range of motion.      Cervical back: Normal range of motion and neck supple.   Skin:     General: Skin is warm and dry.   Neurological:      Mental Status: She is alert and oriented to person, place, and time.   Psychiatric:         Mood and Affect: Mood normal.            Current Outpatient Medications:     dextroamphetamine-amphetamine 30 mg Tab, Take 1 tablet (30 mg total) by mouth 2 (two) times a day., Disp: 60 tablet, Rfl: 0    etonogestreL-ethinyl estradioL (NUVARING) 0.12-0.015 mg/24 hr vaginal ring, Place vaginally., Disp: , Rfl:     famotidine (PEPCID) 40 MG tablet, Take 1 tablet (40 mg total) by mouth once daily., Disp: 90 tablet, Rfl: 1    ibuprofen (ADVIL,MOTRIN) 600 MG tablet, Take 600 mg by mouth., Disp: , Rfl:     amoxicillin-clavulanate 875-125mg (AUGMENTIN) 875-125 mg per tablet, Take 1 tablet by mouth every 12 (twelve) hours. for 10 days, Disp: 20 tablet, Rfl: 0    fluticasone propionate (FLONASE) 50 mcg/actuation nasal spray, 2 sprays by Each Nostril route., Disp: , Rfl:     methylPREDNISolone (MEDROL DOSEPACK) 4 mg tablet, use as directed, Disp: 21 each, Rfl: 0    ondansetron (ZOFRAN-ODT) 4 MG TbDL, Take 1 tablet (4 mg total) by mouth every 8 (eight) hours as needed (nausea). (Patient not taking: Reported on 10/23/2023), Disp: 12 tablet, Rfl: 2    Assessment & Plan:     Problem List Items Addressed This Visit          ENT    Sinusitis - Primary     Rx Augmentin  Rx Medrol Dose Robert  OTC anti-tussive prn  Instructed to continue to monitor symptoms  Report to ED for any CP, SOB, and/or worsening symptoms  Pt is agreeable to plan and verbalizes understanding             Relevant Medications    amoxicillin-clavulanate 875-125mg (AUGMENTIN) 875-125 mg per tablet    methylPREDNISolone (MEDROL DOSEPACK)  4 mg tablet

## 2023-11-01 ENCOUNTER — OFFICE VISIT (OUTPATIENT)
Dept: FAMILY MEDICINE | Facility: CLINIC | Age: 28
End: 2023-11-01
Payer: COMMERCIAL

## 2023-11-01 VITALS
HEIGHT: 59 IN | HEART RATE: 97 BPM | OXYGEN SATURATION: 97 % | SYSTOLIC BLOOD PRESSURE: 120 MMHG | TEMPERATURE: 99 F | WEIGHT: 140 LBS | DIASTOLIC BLOOD PRESSURE: 65 MMHG | RESPIRATION RATE: 20 BRPM | BODY MASS INDEX: 28.22 KG/M2

## 2023-11-01 DIAGNOSIS — K21.9 GASTROESOPHAGEAL REFLUX DISEASE WITHOUT ESOPHAGITIS: Chronic | ICD-10-CM

## 2023-11-01 DIAGNOSIS — F32.1 CURRENT MODERATE EPISODE OF MAJOR DEPRESSIVE DISORDER WITHOUT PRIOR EPISODE: Primary | ICD-10-CM

## 2023-11-01 PROCEDURE — 3008F PR BODY MASS INDEX (BMI) DOCUMENTED: ICD-10-PCS | Mod: CPTII,,, | Performed by: NURSE PRACTITIONER

## 2023-11-01 PROCEDURE — 3078F DIAST BP <80 MM HG: CPT | Mod: CPTII,,, | Performed by: NURSE PRACTITIONER

## 2023-11-01 PROCEDURE — 3078F PR MOST RECENT DIASTOLIC BLOOD PRESSURE < 80 MM HG: ICD-10-PCS | Mod: CPTII,,, | Performed by: NURSE PRACTITIONER

## 2023-11-01 PROCEDURE — 1160F PR REVIEW ALL MEDS BY PRESCRIBER/CLIN PHARMACIST DOCUMENTED: ICD-10-PCS | Mod: CPTII,,, | Performed by: NURSE PRACTITIONER

## 2023-11-01 PROCEDURE — 99213 PR OFFICE/OUTPT VISIT, EST, LEVL III, 20-29 MIN: ICD-10-PCS | Mod: ,,, | Performed by: NURSE PRACTITIONER

## 2023-11-01 PROCEDURE — 1159F MED LIST DOCD IN RCRD: CPT | Mod: CPTII,,, | Performed by: NURSE PRACTITIONER

## 2023-11-01 PROCEDURE — 99213 OFFICE O/P EST LOW 20 MIN: CPT | Mod: ,,, | Performed by: NURSE PRACTITIONER

## 2023-11-01 PROCEDURE — 1160F RVW MEDS BY RX/DR IN RCRD: CPT | Mod: CPTII,,, | Performed by: NURSE PRACTITIONER

## 2023-11-01 PROCEDURE — 3074F PR MOST RECENT SYSTOLIC BLOOD PRESSURE < 130 MM HG: ICD-10-PCS | Mod: CPTII,,, | Performed by: NURSE PRACTITIONER

## 2023-11-01 PROCEDURE — 3074F SYST BP LT 130 MM HG: CPT | Mod: CPTII,,, | Performed by: NURSE PRACTITIONER

## 2023-11-01 PROCEDURE — 3008F BODY MASS INDEX DOCD: CPT | Mod: CPTII,,, | Performed by: NURSE PRACTITIONER

## 2023-11-01 PROCEDURE — 1159F PR MEDICATION LIST DOCUMENTED IN MEDICAL RECORD: ICD-10-PCS | Mod: CPTII,,, | Performed by: NURSE PRACTITIONER

## 2023-11-01 RX ORDER — FAMOTIDINE 40 MG/1
40 TABLET, FILM COATED ORAL
Qty: 90 TABLET | Refills: 1 | Status: SHIPPED | OUTPATIENT
Start: 2023-11-01

## 2023-11-01 NOTE — PROGRESS NOTES
Subjective:      Patient ID: Yue Hickman is a 27 y.o. Black or  female      Chief Complaint: Leave of Absence       Past Medical History:   Diagnosis Date    ADHD (attention deficit hyperactivity disorder)     Esophagitis     GERD (gastroesophageal reflux disease)     Neurofibromatosis, type 1 6/7/2023        HPI  Presents to clinic for evaluation for work excuse.       Depressed mood:  She is currently on leave on absence with [employer], which started on 10/23/2023, she will be returning to work on tomorrow.  Hx of Anxiety/Depression since death of best friend.  Not currently taking any medications and does not want to start at this time.  Denies any suicidal/homicidal ideations.  [facility]; she has not heard from anyone.  Denies any other problems.          Patient Care Team:  Rosie Alfonso MD as PCP - General (Internal Medicine)      Review of Systems   Constitutional: Negative.  Negative for appetite change, chills and fever.   HENT: Negative.     Eyes: Negative.  Negative for discharge and redness.   Respiratory: Negative.  Negative for shortness of breath.    Cardiovascular: Negative.  Negative for chest pain.   Gastrointestinal: Negative.    Endocrine: Negative.    Genitourinary: Negative.    Integumentary:  Negative.   Allergic/Immunologic: Negative.    Neurological: Negative.    Psychiatric/Behavioral:  Positive for dysphoric mood. Negative for behavioral problems, confusion, hallucinations, self-injury and suicidal ideas.    All other systems reviewed and are negative.          Objective:      Vitals:    11/01/23 0931   BP: 120/65   Pulse: 97   Resp: 20   Temp: 98.5 °F (36.9 °C)      Body mass index is 28.28 kg/m².     Physical Exam  Vitals reviewed.   Constitutional:       Appearance: Normal appearance.   HENT:      Head: Normocephalic.      Mouth/Throat:      Mouth: Mucous membranes are moist.   Eyes:      Conjunctiva/sclera: Conjunctivae normal.       Pupils: Pupils are equal, round, and reactive to light.   Cardiovascular:      Rate and Rhythm: Normal rate and regular rhythm.   Pulmonary:      Effort: Pulmonary effort is normal. No respiratory distress.      Breath sounds: Normal breath sounds.   Musculoskeletal:         General: Normal range of motion.      Cervical back: Normal range of motion and neck supple.   Skin:     General: Skin is warm and dry.   Neurological:      Mental Status: She is alert and oriented to person, place, and time.   Psychiatric:         Mood and Affect: Mood normal.            Current Outpatient Medications:     amoxicillin-clavulanate 875-125mg (AUGMENTIN) 875-125 mg per tablet, Take 1 tablet by mouth every 12 (twelve) hours. for 10 days, Disp: 20 tablet, Rfl: 0    dextroamphetamine-amphetamine 30 mg Tab, Take 1 tablet (30 mg total) by mouth 2 (two) times a day., Disp: 60 tablet, Rfl: 0    etonogestreL-ethinyl estradioL (NUVARING) 0.12-0.015 mg/24 hr vaginal ring, Place vaginally., Disp: , Rfl:     famotidine (PEPCID) 40 MG tablet, TAKE 1 TABLET BY MOUTH EVERY DAY, Disp: 90 tablet, Rfl: 1    fluticasone propionate (FLONASE) 50 mcg/actuation nasal spray, 2 sprays by Each Nostril route., Disp: , Rfl:     ibuprofen (ADVIL,MOTRIN) 600 MG tablet, Take 600 mg by mouth., Disp: , Rfl:     methylPREDNISolone (MEDROL DOSEPACK) 4 mg tablet, use as directed (Patient not taking: Reported on 11/1/2023), Disp: 21 each, Rfl: 0    ondansetron (ZOFRAN-ODT) 4 MG TbDL, Take 1 tablet (4 mg total) by mouth every 8 (eight) hours as needed (nausea). (Patient not taking: Reported on 10/23/2023), Disp: 12 tablet, Rfl: 2    Assessment & Plan:     Problem List Items Addressed This Visit          Psychiatric    Current moderate episode of major depressive disorder without prior episode - Primary     Pt needs work excuse from 10/23/2023-11/2/2023; excuse given  Previously referred to [facility]'s office; she has not heard from anyone; will check on  referral  Instructed to continue to monitor symptoms  Report to ED for any suicidal/homicidal ideations, CP, SOB, and/or worsening symptoms  Pt is agreeable to plan and verbalizes understanding                     Prior to the patient's arrival on the same day, I spent (7) minutes reviewing chart. Once in the exam room with the patient, I spent (10 ) minutes in the room with the member performing a history and exam as well as reviewing the test results and recommendations with the patient. After leaving the exam room, I spent an additional (3) minutes completing the electronic health record. The total time spent that day caring for the member is (20 ) minutes, and this time - including the breakdown - is documented in the medical record.

## 2023-11-01 NOTE — ASSESSMENT & PLAN NOTE
Pt needs work excuse from 10/23/2023-11/2/2023; excuse given  Previously referred to [facility] office; she has not heard from anyone; will check on referral  Instructed to continue to monitor symptoms  Report to ED for any suicidal/homicidal ideations, CP, SOB, and/or worsening symptoms  Pt is agreeable to plan and verbalizes understanding

## 2023-11-21 RX ORDER — DEXTROAMPHETAMINE SACCHARATE, AMPHETAMINE ASPARTATE, DEXTROAMPHETAMINE SULFATE AND AMPHETAMINE SULFATE 7.5; 7.5; 7.5; 7.5 MG/1; MG/1; MG/1; MG/1
30 TABLET ORAL 2 TIMES DAILY
Qty: 60 TABLET | Refills: 0 | Status: SHIPPED | OUTPATIENT
Start: 2023-11-21 | End: 2023-12-06 | Stop reason: SDUPTHER

## 2023-11-21 RX ORDER — ONDANSETRON 4 MG/1
4 TABLET, ORALLY DISINTEGRATING ORAL EVERY 8 HOURS PRN
Qty: 12 TABLET | Refills: 2 | Status: SHIPPED | OUTPATIENT
Start: 2023-11-21 | End: 2024-03-14 | Stop reason: SDUPTHER

## 2023-12-06 ENCOUNTER — OFFICE VISIT (OUTPATIENT)
Dept: FAMILY MEDICINE | Facility: CLINIC | Age: 28
End: 2023-12-06
Payer: COMMERCIAL

## 2023-12-06 VITALS
WEIGHT: 137.13 LBS | SYSTOLIC BLOOD PRESSURE: 110 MMHG | DIASTOLIC BLOOD PRESSURE: 69 MMHG | OXYGEN SATURATION: 98 % | HEIGHT: 59 IN | TEMPERATURE: 98 F | RESPIRATION RATE: 20 BRPM | BODY MASS INDEX: 27.64 KG/M2 | HEART RATE: 86 BPM

## 2023-12-06 DIAGNOSIS — F90.9 ATTENTION DEFICIT HYPERACTIVITY DISORDER (ADHD), UNSPECIFIED ADHD TYPE: Primary | Chronic | ICD-10-CM

## 2023-12-06 PROCEDURE — 3008F PR BODY MASS INDEX (BMI) DOCUMENTED: ICD-10-PCS | Mod: CPTII,,, | Performed by: NURSE PRACTITIONER

## 2023-12-06 PROCEDURE — 3008F BODY MASS INDEX DOCD: CPT | Mod: CPTII,,, | Performed by: NURSE PRACTITIONER

## 2023-12-06 PROCEDURE — 3078F DIAST BP <80 MM HG: CPT | Mod: CPTII,,, | Performed by: NURSE PRACTITIONER

## 2023-12-06 PROCEDURE — 1159F PR MEDICATION LIST DOCUMENTED IN MEDICAL RECORD: ICD-10-PCS | Mod: CPTII,,, | Performed by: NURSE PRACTITIONER

## 2023-12-06 PROCEDURE — 1160F PR REVIEW ALL MEDS BY PRESCRIBER/CLIN PHARMACIST DOCUMENTED: ICD-10-PCS | Mod: CPTII,,, | Performed by: NURSE PRACTITIONER

## 2023-12-06 PROCEDURE — 99213 OFFICE O/P EST LOW 20 MIN: CPT | Mod: ,,, | Performed by: NURSE PRACTITIONER

## 2023-12-06 PROCEDURE — 1160F RVW MEDS BY RX/DR IN RCRD: CPT | Mod: CPTII,,, | Performed by: NURSE PRACTITIONER

## 2023-12-06 PROCEDURE — 99213 PR OFFICE/OUTPT VISIT, EST, LEVL III, 20-29 MIN: ICD-10-PCS | Mod: ,,, | Performed by: NURSE PRACTITIONER

## 2023-12-06 PROCEDURE — 3074F SYST BP LT 130 MM HG: CPT | Mod: CPTII,,, | Performed by: NURSE PRACTITIONER

## 2023-12-06 PROCEDURE — 3074F PR MOST RECENT SYSTOLIC BLOOD PRESSURE < 130 MM HG: ICD-10-PCS | Mod: CPTII,,, | Performed by: NURSE PRACTITIONER

## 2023-12-06 PROCEDURE — 1159F MED LIST DOCD IN RCRD: CPT | Mod: CPTII,,, | Performed by: NURSE PRACTITIONER

## 2023-12-06 PROCEDURE — 3078F PR MOST RECENT DIASTOLIC BLOOD PRESSURE < 80 MM HG: ICD-10-PCS | Mod: CPTII,,, | Performed by: NURSE PRACTITIONER

## 2023-12-06 RX ORDER — DEXTROAMPHETAMINE SACCHARATE, AMPHETAMINE ASPARTATE, DEXTROAMPHETAMINE SULFATE AND AMPHETAMINE SULFATE 7.5; 7.5; 7.5; 7.5 MG/1; MG/1; MG/1; MG/1
30 TABLET ORAL 2 TIMES DAILY
Qty: 60 TABLET | Refills: 0 | Status: SHIPPED | OUTPATIENT
Start: 2024-02-19 | End: 2024-03-20

## 2023-12-06 RX ORDER — DEXTROAMPHETAMINE SACCHARATE, AMPHETAMINE ASPARTATE, DEXTROAMPHETAMINE SULFATE AND AMPHETAMINE SULFATE 7.5; 7.5; 7.5; 7.5 MG/1; MG/1; MG/1; MG/1
30 TABLET ORAL 2 TIMES DAILY
Qty: 60 TABLET | Refills: 0 | Status: SHIPPED | OUTPATIENT
Start: 2023-12-20 | End: 2024-03-20

## 2023-12-06 RX ORDER — DEXTROAMPHETAMINE SACCHARATE, AMPHETAMINE ASPARTATE, DEXTROAMPHETAMINE SULFATE AND AMPHETAMINE SULFATE 7.5; 7.5; 7.5; 7.5 MG/1; MG/1; MG/1; MG/1
30 TABLET ORAL 2 TIMES DAILY
Qty: 60 TABLET | Refills: 0 | Status: SHIPPED | OUTPATIENT
Start: 2024-01-19 | End: 2024-03-20

## 2023-12-06 RX ORDER — FLUTICASONE PROPIONATE 50 MCG
2 SPRAY, SUSPENSION (ML) NASAL DAILY
Qty: 9.9 ML | Refills: 6 | Status: SHIPPED | OUTPATIENT
Start: 2023-12-06

## 2023-12-06 NOTE — PROGRESS NOTES
Subjective:      Patient ID: Yue Hickman is a 27 y.o. 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     Hx ADHD since childhood.  Currently taking  Adderall 30 mg po BID. Medication is working well.  Denies any ADR.  Refills needed.     Needs refills of Flonase.  Denies any other problems.          Patient Care Team:  Rosie Alfonso MD as PCP - General (Internal Medicine)      Review of Systems   Constitutional: Negative.  Negative for appetite change, chills and fever.   HENT: Negative.     Eyes: Negative.  Negative for discharge and redness.   Respiratory: Negative.  Negative for shortness of breath.    Cardiovascular: Negative.  Negative for chest pain.   Gastrointestinal: Negative.    Endocrine: Negative.    Genitourinary: Negative.    Integumentary:  Negative.   Allergic/Immunologic: Negative.    Neurological: Negative.    Psychiatric/Behavioral: Negative.     All other systems reviewed and are negative.          Objective:      Vitals:    12/06/23 0807   BP: 110/69   Pulse: 86   Resp: 20   Temp: 98 °F (36.7 °C)      Body mass index is 27.69 kg/m².     Physical Exam  Vitals reviewed.   Constitutional:       Appearance: Normal appearance.   HENT:      Head: Normocephalic.      Mouth/Throat:      Mouth: Mucous membranes are moist.   Eyes:      Conjunctiva/sclera: Conjunctivae normal.      Pupils: Pupils are equal, round, and reactive to light.   Cardiovascular:      Rate and Rhythm: Normal rate and regular rhythm.   Pulmonary:      Effort: Pulmonary effort is normal. No respiratory distress.      Breath sounds: Normal breath sounds.   Musculoskeletal:         General: Normal range of motion.      Cervical back:  Normal range of motion and neck supple.   Skin:     General: Skin is warm and dry.   Neurological:      Mental Status: She is alert and oriented to person, place, and time.   Psychiatric:         Mood and Affect: Mood normal.            Current Outpatient Medications:     etonogestreL-ethinyl estradioL (NUVARING) 0.12-0.015 mg/24 hr vaginal ring, Place vaginally., Disp: , Rfl:     famotidine (PEPCID) 40 MG tablet, TAKE 1 TABLET BY MOUTH EVERY DAY, Disp: 90 tablet, Rfl: 1    ibuprofen (ADVIL,MOTRIN) 600 MG tablet, Take 600 mg by mouth., Disp: , Rfl:     ondansetron (ZOFRAN-ODT) 4 MG TbDL, Take 1 tablet (4 mg total) by mouth every 8 (eight) hours as needed (nausea)., Disp: 12 tablet, Rfl: 2    [START ON 2/19/2024] dextroamphetamine-amphetamine 30 mg Tab, Take 1 tablet (30 mg total) by mouth 2 (two) times a day., Disp: 60 tablet, Rfl: 0    [START ON 1/19/2024] dextroamphetamine-amphetamine 30 mg Tab, Take 1 tablet (30 mg total) by mouth 2 (two) times a day., Disp: 60 tablet, Rfl: 0    [START ON 12/20/2023] dextroamphetamine-amphetamine 30 mg Tab, Take 1 tablet (30 mg total) by mouth 2 (two) times a day., Disp: 60 tablet, Rfl: 0    fluticasone propionate (FLONASE) 50 mcg/actuation nasal spray, 2 sprays (100 mcg total) by Each Nostril route once daily., Disp: 9.9 mL, Rfl: 6    methylPREDNISolone (MEDROL DOSEPACK) 4 mg tablet, use as directed (Patient not taking: Reported on 11/1/2023), Disp: 21 each, Rfl: 0    Assessment & Plan:     Problem List Items Addressed This Visit          Psychiatric    ADHD (attention deficit hyperactivity disorder) - Primary (Chronic)     Stable  Currently taking Aderrall 30 mg po BID;  reviewed; Rx sent  Instructed to continue to monitor symptoms  Report to ED for any CP, SOB, and/or worsening symptoms  Keep appt with PCP for follow up  Pt is agreeable to plan and verbalizes understanding            Relevant Medications    dextroamphetamine-amphetamine 30 mg Tab (Start on 2/19/2024)     dextroamphetamine-amphetamine 30 mg Tab (Start on 1/19/2024)    dextroamphetamine-amphetamine 30 mg Tab (Start on 12/20/2023)

## 2024-03-14 DIAGNOSIS — K21.9 GASTROESOPHAGEAL REFLUX DISEASE WITHOUT ESOPHAGITIS: Primary | Chronic | ICD-10-CM

## 2024-03-15 RX ORDER — ONDANSETRON 4 MG/1
4 TABLET, ORALLY DISINTEGRATING ORAL EVERY 8 HOURS PRN
Qty: 12 TABLET | Refills: 2 | Status: SHIPPED | OUTPATIENT
Start: 2024-03-15 | End: 2024-03-20 | Stop reason: SDUPTHER

## 2024-03-20 ENCOUNTER — OFFICE VISIT (OUTPATIENT)
Dept: FAMILY MEDICINE | Facility: CLINIC | Age: 29
End: 2024-03-20
Payer: COMMERCIAL

## 2024-03-20 ENCOUNTER — TELEPHONE (OUTPATIENT)
Dept: FAMILY MEDICINE | Facility: CLINIC | Age: 29
End: 2024-03-20

## 2024-03-20 VITALS
OXYGEN SATURATION: 99 % | WEIGHT: 134.38 LBS | DIASTOLIC BLOOD PRESSURE: 75 MMHG | SYSTOLIC BLOOD PRESSURE: 122 MMHG | HEART RATE: 83 BPM | RESPIRATION RATE: 20 BRPM | TEMPERATURE: 97 F | HEIGHT: 59 IN | BODY MASS INDEX: 27.09 KG/M2

## 2024-03-20 DIAGNOSIS — K21.9 GASTROESOPHAGEAL REFLUX DISEASE WITHOUT ESOPHAGITIS: Chronic | ICD-10-CM

## 2024-03-20 DIAGNOSIS — F90.9 ATTENTION DEFICIT HYPERACTIVITY DISORDER (ADHD), UNSPECIFIED ADHD TYPE: Primary | Chronic | ICD-10-CM

## 2024-03-20 PROCEDURE — 3074F SYST BP LT 130 MM HG: CPT | Mod: CPTII,,, | Performed by: NURSE PRACTITIONER

## 2024-03-20 PROCEDURE — 1159F MED LIST DOCD IN RCRD: CPT | Mod: CPTII,,, | Performed by: NURSE PRACTITIONER

## 2024-03-20 PROCEDURE — 1160F RVW MEDS BY RX/DR IN RCRD: CPT | Mod: CPTII,,, | Performed by: NURSE PRACTITIONER

## 2024-03-20 PROCEDURE — 3008F BODY MASS INDEX DOCD: CPT | Mod: CPTII,,, | Performed by: NURSE PRACTITIONER

## 2024-03-20 PROCEDURE — 99214 OFFICE O/P EST MOD 30 MIN: CPT | Mod: ,,, | Performed by: NURSE PRACTITIONER

## 2024-03-20 PROCEDURE — 3078F DIAST BP <80 MM HG: CPT | Mod: CPTII,,, | Performed by: NURSE PRACTITIONER

## 2024-03-20 RX ORDER — DEXTROAMPHETAMINE SACCHARATE, AMPHETAMINE ASPARTATE, DEXTROAMPHETAMINE SULFATE AND AMPHETAMINE SULFATE 7.5; 7.5; 7.5; 7.5 MG/1; MG/1; MG/1; MG/1
30 TABLET ORAL 2 TIMES DAILY
Qty: 60 TABLET | Refills: 0 | Status: SHIPPED | OUTPATIENT
Start: 2024-05-20

## 2024-03-20 RX ORDER — DEXTROAMPHETAMINE SACCHARATE, AMPHETAMINE ASPARTATE, DEXTROAMPHETAMINE SULFATE AND AMPHETAMINE SULFATE 7.5; 7.5; 7.5; 7.5 MG/1; MG/1; MG/1; MG/1
30 TABLET ORAL 2 TIMES DAILY
Qty: 60 TABLET | Refills: 0 | Status: SHIPPED | OUTPATIENT
Start: 2024-03-20 | End: 2024-04-18 | Stop reason: SDUPTHER

## 2024-03-20 RX ORDER — PROMETHAZINE HYDROCHLORIDE AND DEXTROMETHORPHAN HYDROBROMIDE 6.25; 15 MG/5ML; MG/5ML
5 SYRUP ORAL EVERY 6 HOURS PRN
COMMUNITY
Start: 2024-01-10

## 2024-03-20 RX ORDER — DEXTROAMPHETAMINE SACCHARATE, AMPHETAMINE ASPARTATE, DEXTROAMPHETAMINE SULFATE AND AMPHETAMINE SULFATE 7.5; 7.5; 7.5; 7.5 MG/1; MG/1; MG/1; MG/1
30 TABLET ORAL 2 TIMES DAILY
Qty: 60 TABLET | Refills: 0 | Status: SHIPPED | OUTPATIENT
Start: 2024-04-20

## 2024-03-20 RX ORDER — ONDANSETRON 4 MG/1
4 TABLET, ORALLY DISINTEGRATING ORAL EVERY 8 HOURS PRN
Qty: 30 TABLET | Refills: 3 | Status: SHIPPED | OUTPATIENT
Start: 2024-03-20 | End: 2024-05-09 | Stop reason: SDUPTHER

## 2024-03-20 NOTE — ASSESSMENT & PLAN NOTE
Stable  Continue Adderall as prescribed;  viewed  Reminded patient this is a controlled medication and must be seen q3 months while on.    Patient is agreeable to plan and verbalized understanding   Keep appt with PCP for follow up

## 2024-03-20 NOTE — PROGRESS NOTES
Subjective:      Patient ID: Yue Hickman is a 28 y.o. 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     Hx ADHD since childhood.  Currently taking  Adderall 30 mg po BID. Medication is working well.  Denies any ADR.  Refills needed.     GERD: stable.  Takes Pepcid and Zofran prn.  Needs refill of Zofran.            Patient Care Team:  Rosie Alfonso MD as PCP - General (Internal Medicine)      Review of Systems   Constitutional:  Negative for activity change and unexpected weight change.   HENT:  Negative for hearing loss, rhinorrhea and trouble swallowing.    Eyes:  Negative for discharge and visual disturbance.   Respiratory:  Negative for chest tightness and wheezing.    Cardiovascular:  Negative for chest pain and palpitations.   Gastrointestinal:  Negative for blood in stool, constipation, diarrhea and vomiting.   Endocrine: Negative for polydipsia and polyuria.   Genitourinary:  Negative for difficulty urinating, dysuria, hematuria and menstrual problem.   Musculoskeletal:  Negative for arthralgias, joint swelling and neck pain.   Neurological:  Negative for weakness and headaches.   Psychiatric/Behavioral:  Negative for confusion and dysphoric mood.            Objective:      Vitals:    03/20/24 1101   BP: 122/75   Pulse: 83   Resp: 20   Temp: 97.2 °F (36.2 °C)      Body mass index is 27.15 kg/m².     Physical Exam  Vitals reviewed.   Constitutional:       Appearance: Normal appearance.   HENT:      Head: Normocephalic.      Mouth/Throat:      Mouth: Mucous membranes are moist.   Eyes:      Conjunctiva/sclera: Conjunctivae normal.      Pupils: Pupils are equal, round, and reactive to light.   Cardiovascular:      Rate  and Rhythm: Normal rate and regular rhythm.   Pulmonary:      Effort: Pulmonary effort is normal. No respiratory distress.      Breath sounds: Normal breath sounds.   Musculoskeletal:         General: Normal range of motion.      Cervical back: Normal range of motion and neck supple.   Skin:     General: Skin is warm and dry.   Neurological:      Mental Status: She is alert and oriented to person, place, and time.   Psychiatric:         Mood and Affect: Mood normal.            Current Outpatient Medications:     etonogestreL-ethinyl estradioL (NUVARING) 0.12-0.015 mg/24 hr vaginal ring, Place vaginally., Disp: , Rfl:     famotidine (PEPCID) 40 MG tablet, TAKE 1 TABLET BY MOUTH EVERY DAY, Disp: 90 tablet, Rfl: 1    fluticasone propionate (FLONASE) 50 mcg/actuation nasal spray, 2 sprays (100 mcg total) by Each Nostril route once daily., Disp: 9.9 mL, Rfl: 6    [START ON 5/20/2024] dextroamphetamine-amphetamine 30 mg Tab, Take 1 tablet (30 mg total) by mouth 2 (two) times a day., Disp: 60 tablet, Rfl: 0    [START ON 4/20/2024] dextroamphetamine-amphetamine 30 mg Tab, Take 1 tablet (30 mg total) by mouth 2 (two) times a day., Disp: 60 tablet, Rfl: 0    dextroamphetamine-amphetamine 30 mg Tab, Take 1 tablet (30 mg total) by mouth 2 (two) times a day., Disp: 60 tablet, Rfl: 0    ibuprofen (ADVIL,MOTRIN) 600 MG tablet, Take 600 mg by mouth., Disp: , Rfl:     methylPREDNISolone (MEDROL DOSEPACK) 4 mg tablet, use as directed (Patient not taking: Reported on 11/1/2023), Disp: 21 each, Rfl: 0    ondansetron (ZOFRAN-ODT) 4 MG TbDL, Take 1 tablet (4 mg total) by mouth every 8 (eight) hours as needed (nausea)., Disp: 30 tablet, Rfl: 3    promethazine-dextromethorphan (PROMETHAZINE-DM) 6.25-15 mg/5 mL Syrp, Take 5 mLs by mouth every 6 (six) hours as needed., Disp: , Rfl:     Assessment & Plan:     Problem List Items Addressed This Visit          Psychiatric    ADHD (attention deficit hyperactivity disorder) - Primary (Chronic)      Stable  Continue Adderall as prescribed;  viewed  Reminded patient this is a controlled medication and must be seen q3 months while on.    Patient is agreeable to plan and verbalized understanding   Keep appt with PCP for follow up           Relevant Medications    dextroamphetamine-amphetamine 30 mg Tab (Start on 5/20/2024)    dextroamphetamine-amphetamine 30 mg Tab (Start on 4/20/2024)    dextroamphetamine-amphetamine 30 mg Tab       GI    GERD (gastroesophageal reflux disease) (Chronic)     Stable  Continue current med; Rx Zofran sent  Keep appt with PCP for follow up         Relevant Medications    ondansetron (ZOFRAN-ODT) 4 MG TbDL

## 2024-04-17 DIAGNOSIS — F90.9 ATTENTION DEFICIT HYPERACTIVITY DISORDER (ADHD), UNSPECIFIED ADHD TYPE: Chronic | ICD-10-CM

## 2024-04-17 RX ORDER — DEXTROAMPHETAMINE SACCHARATE, AMPHETAMINE ASPARTATE, DEXTROAMPHETAMINE SULFATE AND AMPHETAMINE SULFATE 7.5; 7.5; 7.5; 7.5 MG/1; MG/1; MG/1; MG/1
30 TABLET ORAL 2 TIMES DAILY
Qty: 60 TABLET | Refills: 0 | Status: CANCELLED | OUTPATIENT
Start: 2024-04-17

## 2024-04-17 NOTE — TELEPHONE ENCOUNTER
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 Pt c/o Neurofibroma growing externally and is requesting a MRI. Does pt need appt? Pt requested Adderall Rx sent to [facility]. Please see attached refill request. Thanks

## 2024-04-17 NOTE — TELEPHONE ENCOUNTER
----- Message from Britany Umanzor sent at 4/17/2024  8:25 AM CDT -----  Regarding: medical advice  Type:  Needs Medical Advice    Who Called: pt     Pharmacy name and phone #:  [facility]   Would the patient rather a call back or a response via [facility]? C/b   Best Call Back Number: [phone]  Additional Information: pt is calling to see if she can talk to the nurse about getting an MRI done and to see if she can get her refill done today because the pharmacy is closed tomorrow.

## 2024-04-18 DIAGNOSIS — F90.9 ATTENTION DEFICIT HYPERACTIVITY DISORDER (ADHD), UNSPECIFIED ADHD TYPE: Chronic | ICD-10-CM

## 2024-04-18 RX ORDER — DEXTROAMPHETAMINE SACCHARATE, AMPHETAMINE ASPARTATE, DEXTROAMPHETAMINE SULFATE AND AMPHETAMINE SULFATE 7.5; 7.5; 7.5; 7.5 MG/1; MG/1; MG/1; MG/1
30 TABLET ORAL 2 TIMES DAILY
Qty: 60 TABLET | Refills: 0 | Status: SHIPPED | OUTPATIENT
Start: 2024-04-18 | End: 2024-06-18 | Stop reason: SDUPTHER

## 2024-04-18 RX ORDER — FLUTICASONE PROPIONATE 50 MCG
2 SPRAY, SUSPENSION (ML) NASAL DAILY
Qty: 9.9 ML | Refills: 6 | Status: SHIPPED | OUTPATIENT
Start: 2024-04-18

## 2024-04-18 NOTE — TELEPHONE ENCOUNTER
Start date for refill of Adderall is 4/20/24. Pt requesting refill a day early for Adderall. Pharmacy is closed on Saturday.

## 2024-04-19 ENCOUNTER — TELEPHONE (OUTPATIENT)
Dept: FAMILY MEDICINE | Facility: CLINIC | Age: 29
End: 2024-04-19

## 2024-04-22 NOTE — TELEPHONE ENCOUNTER
Spoke with pt  Advised that she will only need 1 appt  Stated that she is ok waiting until July  Thanks

## 2024-05-09 DIAGNOSIS — K21.9 GASTROESOPHAGEAL REFLUX DISEASE WITHOUT ESOPHAGITIS: Chronic | ICD-10-CM

## 2024-05-10 RX ORDER — ONDANSETRON 4 MG/1
4 TABLET, ORALLY DISINTEGRATING ORAL EVERY 8 HOURS PRN
Qty: 30 TABLET | Refills: 3 | Status: SHIPPED | OUTPATIENT
Start: 2024-05-10 | End: 2024-06-18 | Stop reason: SDUPTHER

## 2024-06-18 DIAGNOSIS — K21.9 GASTROESOPHAGEAL REFLUX DISEASE WITHOUT ESOPHAGITIS: Chronic | ICD-10-CM

## 2024-06-18 DIAGNOSIS — F90.9 ATTENTION DEFICIT HYPERACTIVITY DISORDER (ADHD), UNSPECIFIED ADHD TYPE: Chronic | ICD-10-CM

## 2024-06-18 RX ORDER — ONDANSETRON 4 MG/1
4 TABLET, ORALLY DISINTEGRATING ORAL EVERY 8 HOURS PRN
Qty: 30 TABLET | Refills: 3 | Status: SHIPPED | OUTPATIENT
Start: 2024-06-18

## 2024-06-18 RX ORDER — DEXTROAMPHETAMINE SACCHARATE, AMPHETAMINE ASPARTATE, DEXTROAMPHETAMINE SULFATE AND AMPHETAMINE SULFATE 7.5; 7.5; 7.5; 7.5 MG/1; MG/1; MG/1; MG/1
30 TABLET ORAL 2 TIMES DAILY
Qty: 60 TABLET | Refills: 0 | Status: SHIPPED | OUTPATIENT
Start: 2024-06-18

## 2024-07-03 ENCOUNTER — OFFICE VISIT (OUTPATIENT)
Dept: FAMILY MEDICINE | Facility: CLINIC | Age: 29
End: 2024-07-03
Payer: COMMERCIAL

## 2024-07-03 ENCOUNTER — LAB VISIT (OUTPATIENT)
Dept: LAB | Facility: HOSPITAL | Age: 29
End: 2024-07-03
Attending: INTERNAL MEDICINE
Payer: COMMERCIAL

## 2024-07-03 ENCOUNTER — TELEPHONE (OUTPATIENT)
Dept: FAMILY MEDICINE | Facility: CLINIC | Age: 29
End: 2024-07-03

## 2024-07-03 VITALS
RESPIRATION RATE: 14 BRPM | TEMPERATURE: 99 F | SYSTOLIC BLOOD PRESSURE: 108 MMHG | DIASTOLIC BLOOD PRESSURE: 70 MMHG | HEART RATE: 82 BPM | BODY MASS INDEX: 27.08 KG/M2 | WEIGHT: 134.31 LBS | HEIGHT: 59 IN | OXYGEN SATURATION: 99 %

## 2024-07-03 DIAGNOSIS — D64.9 ANEMIA, UNSPECIFIED TYPE: ICD-10-CM

## 2024-07-03 DIAGNOSIS — Z13.6 ENCOUNTER FOR LIPID SCREENING FOR CARDIOVASCULAR DISEASE: ICD-10-CM

## 2024-07-03 DIAGNOSIS — Q85.01 NEUROFIBROMATOSIS, TYPE 1: ICD-10-CM

## 2024-07-03 DIAGNOSIS — K21.9 GASTROESOPHAGEAL REFLUX DISEASE WITHOUT ESOPHAGITIS: ICD-10-CM

## 2024-07-03 DIAGNOSIS — F90.9 ATTENTION DEFICIT HYPERACTIVITY DISORDER (ADHD), UNSPECIFIED ADHD TYPE: Chronic | ICD-10-CM

## 2024-07-03 DIAGNOSIS — Z13.220 ENCOUNTER FOR LIPID SCREENING FOR CARDIOVASCULAR DISEASE: ICD-10-CM

## 2024-07-03 DIAGNOSIS — F90.9 ATTENTION DEFICIT HYPERACTIVITY DISORDER (ADHD), UNSPECIFIED ADHD TYPE: ICD-10-CM

## 2024-07-03 DIAGNOSIS — Z00.00 WELLNESS EXAMINATION: ICD-10-CM

## 2024-07-03 DIAGNOSIS — F90.2 ATTENTION DEFICIT HYPERACTIVITY DISORDER (ADHD), COMBINED TYPE: ICD-10-CM

## 2024-07-03 DIAGNOSIS — E66.09 CLASS 1 OBESITY DUE TO EXCESS CALORIES WITHOUT SERIOUS COMORBIDITY WITH BODY MASS INDEX (BMI) OF 31.0 TO 31.9 IN ADULT: ICD-10-CM

## 2024-07-03 DIAGNOSIS — Z00.00 WELLNESS EXAMINATION: Primary | ICD-10-CM

## 2024-07-03 DIAGNOSIS — Z11.4 ENCOUNTER FOR SCREENING FOR HIV: ICD-10-CM

## 2024-07-03 DIAGNOSIS — Z79.899 CONTROLLED SUBSTANCE AGREEMENT SIGNED: ICD-10-CM

## 2024-07-03 PROBLEM — J32.9 SINUSITIS: Status: RESOLVED | Noted: 2023-10-23 | Resolved: 2024-07-03

## 2024-07-03 PROBLEM — M25.532 LEFT WRIST PAIN: Status: RESOLVED | Noted: 2022-05-25 | Resolved: 2024-07-03

## 2024-07-03 PROBLEM — R45.89 DEPRESSED MOOD: Status: RESOLVED | Noted: 2023-06-07 | Resolved: 2024-07-03

## 2024-07-03 PROBLEM — M25.539 ARTHRALGIA OF WRIST: Status: RESOLVED | Noted: 2022-04-08 | Resolved: 2024-07-03

## 2024-07-03 LAB
ALBUMIN SERPL-MCNC: 4.1 G/DL (ref 3.5–5)
ALBUMIN/GLOB SERPL: 1.2 RATIO (ref 1.1–2)
ALP SERPL-CCNC: 33 UNIT/L (ref 40–150)
ALT SERPL-CCNC: 11 UNIT/L (ref 0–55)
ANION GAP SERPL CALC-SCNC: 8 MEQ/L
AST SERPL-CCNC: 13 UNIT/L (ref 5–34)
BASOPHILS # BLD AUTO: 0.03 X10(3)/MCL
BASOPHILS NFR BLD AUTO: 0.5 %
BILIRUB SERPL-MCNC: 0.9 MG/DL
BILIRUB UR QL STRIP.AUTO: NEGATIVE
BUN SERPL-MCNC: 16.2 MG/DL (ref 7–18.7)
CALCIUM SERPL-MCNC: 9.3 MG/DL (ref 8.4–10.2)
CHLORIDE SERPL-SCNC: 107 MMOL/L (ref 98–107)
CHOLEST SERPL-MCNC: 153 MG/DL
CHOLEST/HDLC SERPL: 2 {RATIO} (ref 0–5)
CLARITY UR: CLEAR
CO2 SERPL-SCNC: 24 MMOL/L (ref 22–29)
COLOR UR AUTO: NORMAL
CREAT SERPL-MCNC: 0.77 MG/DL (ref 0.55–1.02)
CREAT/UREA NIT SERPL: 21
EOSINOPHIL # BLD AUTO: 0.05 X10(3)/MCL (ref 0–0.9)
EOSINOPHIL NFR BLD AUTO: 0.9 %
ERYTHROCYTE [DISTWIDTH] IN BLOOD BY AUTOMATED COUNT: 18.9 % (ref 11.5–17)
EST. AVERAGE GLUCOSE BLD GHB EST-MCNC: 76.7 MG/DL
GFR SERPLBLD CREATININE-BSD FMLA CKD-EPI: >60 ML/MIN/1.73/M2
GLOBULIN SER-MCNC: 3.3 GM/DL (ref 2.4–3.5)
GLUCOSE SERPL-MCNC: 80 MG/DL (ref 74–100)
GLUCOSE UR QL STRIP: NEGATIVE
HBA1C MFR BLD: 4.3 %
HCT VFR BLD AUTO: 35.8 % (ref 37–47)
HDLC SERPL-MCNC: 66 MG/DL (ref 35–60)
HGB BLD-MCNC: 11.6 G/DL (ref 12–16)
HGB UR QL STRIP: NEGATIVE
HIV 1+2 AB+HIV1 P24 AG SERPL QL IA: NONREACTIVE
IMM GRANULOCYTES # BLD AUTO: 0.01 X10(3)/MCL (ref 0–0.04)
IMM GRANULOCYTES NFR BLD AUTO: 0.2 %
KETONES UR QL STRIP: NEGATIVE
LDLC SERPL CALC-MCNC: 73 MG/DL (ref 50–140)
LEUKOCYTE ESTERASE UR QL STRIP: NEGATIVE
LYMPHOCYTES # BLD AUTO: 1.96 X10(3)/MCL (ref 0.6–4.6)
LYMPHOCYTES NFR BLD AUTO: 34.4 %
MCH RBC QN AUTO: 23.9 PG (ref 27–31)
MCHC RBC AUTO-ENTMCNC: 32.4 G/DL (ref 33–36)
MCV RBC AUTO: 73.8 FL (ref 80–94)
MONOCYTES # BLD AUTO: 0.4 X10(3)/MCL (ref 0.1–1.3)
MONOCYTES NFR BLD AUTO: 7 %
NEUTROPHILS # BLD AUTO: 3.25 X10(3)/MCL (ref 2.1–9.2)
NEUTROPHILS NFR BLD AUTO: 57 %
NITRITE UR QL STRIP: NEGATIVE
NRBC BLD AUTO-RTO: 0 %
PH UR STRIP: 6.5 [PH]
PLATELET # BLD AUTO: 292 X10(3)/MCL (ref 130–400)
PMV BLD AUTO: 9.4 FL (ref 7.4–10.4)
POTASSIUM SERPL-SCNC: 3.9 MMOL/L (ref 3.5–5.1)
PROT SERPL-MCNC: 7.4 GM/DL (ref 6.4–8.3)
PROT UR QL STRIP: NEGATIVE
RBC # BLD AUTO: 4.85 X10(6)/MCL (ref 4.2–5.4)
SODIUM SERPL-SCNC: 139 MMOL/L (ref 136–145)
SP GR UR STRIP.AUTO: 1.02 (ref 1–1.03)
TRIGL SERPL-MCNC: 68 MG/DL (ref 37–140)
TSH SERPL-ACNC: 1.86 UIU/ML (ref 0.35–4.94)
UROBILINOGEN UR STRIP-ACNC: 0.2
VLDLC SERPL CALC-MCNC: 14 MG/DL
WBC # BLD AUTO: 5.7 X10(3)/MCL (ref 4.5–11.5)

## 2024-07-03 PROCEDURE — 83036 HEMOGLOBIN GLYCOSYLATED A1C: CPT

## 2024-07-03 PROCEDURE — 36415 COLL VENOUS BLD VENIPUNCTURE: CPT

## 2024-07-03 PROCEDURE — 85025 COMPLETE CBC W/AUTO DIFF WBC: CPT

## 2024-07-03 PROCEDURE — 84443 ASSAY THYROID STIM HORMONE: CPT

## 2024-07-03 PROCEDURE — 87389 HIV-1 AG W/HIV-1&-2 AB AG IA: CPT

## 2024-07-03 PROCEDURE — 80061 LIPID PANEL: CPT

## 2024-07-03 PROCEDURE — 80053 COMPREHEN METABOLIC PANEL: CPT

## 2024-07-03 RX ORDER — DEXTROAMPHETAMINE SACCHARATE, AMPHETAMINE ASPARTATE, DEXTROAMPHETAMINE SULFATE AND AMPHETAMINE SULFATE 7.5; 7.5; 7.5; 7.5 MG/1; MG/1; MG/1; MG/1
30 TABLET ORAL 2 TIMES DAILY
Qty: 60 TABLET | Refills: 0 | Status: SHIPPED | OUTPATIENT
Start: 2024-07-03

## 2024-07-03 NOTE — PROGRESS NOTES
"Subjective:      Patient ID: Yue Hickman is a 28 y.o. female.    Chief Complaint: Annual Exam    HPI  Wellness visit  Last seen by Jemma ARDON 3/20/2024 for ADHD med refills.      she is single, no children. 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     Neurofibromatosis type 1  Diagnosed at birth  She has had several fibromas removed from her breasts most recently thru derm  Patient very emotional today- requesting brain MRI, says she feels like she sees more skin lesions than ever before because her body is      GERD  Has seen GI for this  Diagnosed with esophagitis previously  Restart on famotidine at last OV     ADHD  Last refill 6/19/2024  Controlled substance agreement signed 06/07/2023  Urine Drug Screen done June 2023  Health Maintenance Due   Topic Date Due    HIV Screening  Never done    TETANUS VACCINE  08/09/2017    COVID-19 Vaccine (1 - 2023-24 season) Never done     Review of Systems   Constitutional:  Negative for chills and fever.   HENT:  Negative for congestion, sinus pressure and sore throat.    Respiratory:  Negative for cough and shortness of breath.    Cardiovascular:  Negative for chest pain and palpitations.   Gastrointestinal:  Negative for abdominal pain and nausea.   Skin:  Negative for rash.       Objective:     Vitals:    07/03/24 0947   BP: 108/70   BP Location: Left arm   Patient Position: Sitting   BP Method: Medium (Automatic)   Pulse: 82   Resp: 14   Temp: 98.8 °F (37.1 °C)   TempSrc: Temporal   SpO2: 99%   Weight: 60.9 kg (134 lb 4.8 oz)   Height: 4' 11" (1.499 m)     Physical Exam  Vitals and nursing note reviewed.   Constitutional:       General: She is not in acute distress.     Appearance: Normal appearance. She is not ill-appearing.   HENT:      Head: " Normocephalic and atraumatic.   Neurological:      Mental Status: She is alert.       Assessment/Plan:     1. Wellness examination  Fasting labs reviewed    2. Controlled substance agreement signed  PMDP reviewed  Refills x 3 mos   3. Encounter for lipid screening for cardiovascular disease    4. Anemia, unspecified type  -     CBC Auto Differential; Future; Expected date: 10/03/2024  Slight anemia after a heavy period  Repeat in 3 mos to monitor  5. Neurofibromatosis, type 1  -     Cancel: Ambulatory referral/consult to Neurology; Future; Expected date: 07/10/2024  -     MRI Brain Without Contrast; Future; Expected date: 07/03/2024  -     Cancel: Ambulatory referral/consult to Neurology; Future; Expected date: 07/10/2024  -     Ambulatory referral/consult to Genetics; Future; Expected date: 07/10/2024  Patient with NF type 1 no longer with neurology, having more skin lesions than before, sometimes has a headache, sometimes does not  Check MRI brain  Refer to NF clinic in [place] for speciality care  6. Attention deficit hyperactivity disorder (ADHD), unspecified ADHD type  -     dextroamphetamine-amphetamine 30 mg Tab; Take 1 tablet (30 mg total) by mouth 2 (two) times a day.  Dispense: 60 tablet; Refill: 0  -     dextroamphetamine-amphetamine 30 mg Tab; Take 1 tablet (30 mg total) by mouth 2 (two) times a day.  Dispense: 60 tablet; Refill: 0  -     dextroamphetamine-amphetamine 30 mg Tab; Take 1 tablet (30 mg total) by mouth 2 (two) times a day.  Dispense: 60 tablet; Refill: 0       Follow up in about 3 months (around 10/3/2024) for Follow Up - Controlled Med.    This includes face to face time and non-face to face time preparing to see the patient (eg, review of tests), obtaining and/or reviewing separately obtained history, documenting clinical information in the electronic or other health record, independently interpreting results and communicating results to the patient/family/caregiver, or care  coordinator.

## 2024-07-03 NOTE — TELEPHONE ENCOUNTER
----- Message from Kimmie Olivo sent at 7/3/2024  7:47 AM CDT -----  Regarding: lab orders  .Who Called: Yue Hickman    What order is the patient requesting: Lab orders   When does the expect the orders to be performed? Ortho Lab        Preferred Method of Contact: Phone Call  Patient's Preferred Phone Number on File: [phone]   Best Call Back Number, if different:  Additional Information: Pt was at lab, no orders were in, annual appt is this morning.

## 2024-07-10 ENCOUNTER — TELEPHONE (OUTPATIENT)
Dept: FAMILY MEDICINE | Facility: CLINIC | Age: 29
End: 2024-07-10
Payer: COMMERCIAL

## 2024-07-10 NOTE — TELEPHONE ENCOUNTER
Please call patient to let her know MRI brain did not show any acute changes or findings; she does have a small cyst in one of her sinuses that I do not recommend intervention unless she is having recurrent sinus problems, if she is, then we would refer her to ENT.  I did refer patient to a neurofibromatosis clinic in [place], can you ask patient if she has received a phone call from them yet? I'd still like for patient to make sure to f/u there so we can find out more information on how they monitor patients more closely  thanks

## 2024-07-12 NOTE — TELEPHONE ENCOUNTER
Spoke with pt  Results reviewed  Pt denies needing ENT at this time  Pt stated she has not heard from neurofibromatosis clinic in [place] yet  I will have Adela Galaviz our referral coordinator follow up on this  Thanks

## 2024-07-23 ENCOUNTER — TELEPHONE (OUTPATIENT)
Dept: FAMILY MEDICINE | Facility: CLINIC | Age: 29
End: 2024-07-23
Payer: COMMERCIAL

## 2024-07-23 NOTE — TELEPHONE ENCOUNTER
Spoke with Jacqueline   Stated that Molly left for the day already   Will have her return the call tomorrow  Thanks

## 2024-07-23 NOTE — TELEPHONE ENCOUNTER
----- Message from Laurie Tobin sent at 7/23/2024  9:29 AM CDT -----  .Type:  Patient Returning Call    Who Called:Molly with [facility]   Who Left Message for Patient:Molly  Does the patient know what this is regarding?:MRI   Would the patient rather a call back or a response via [facility]?   Best Call Back Number:[phone]  Additional Information: Please call back about mri and previous referral and they do not have this

## 2024-07-29 ENCOUNTER — TELEPHONE (OUTPATIENT)
Dept: FAMILY MEDICINE | Facility: CLINIC | Age: 29
End: 2024-07-29
Payer: COMMERCIAL

## 2024-07-29 NOTE — TELEPHONE ENCOUNTER
Pt stated she has not been to children's [facility] in [place]  Thanks

## 2024-07-29 NOTE — TELEPHONE ENCOUNTER
----- Message from John Coles sent at 7/24/2024  2:07 PM CDT -----  .Who Called: Molly with [facility]    Patient is returning phone call    Who Left Message for Patient:Chayito  Does the patient know what this is regarding?:      Preferred Method of Contact: Phone Call  Patient's Preferred Phone Number on File: [phone]   Best Call Back Number, if different: [phone] ext. [phone]  Additional Information: Molly returning Chayito's call.

## 2024-09-18 DIAGNOSIS — F90.9 ATTENTION DEFICIT HYPERACTIVITY DISORDER (ADHD), UNSPECIFIED ADHD TYPE: Chronic | ICD-10-CM

## 2024-09-18 RX ORDER — DEXTROAMPHETAMINE SACCHARATE, AMPHETAMINE ASPARTATE, DEXTROAMPHETAMINE SULFATE AND AMPHETAMINE SULFATE 7.5; 7.5; 7.5; 7.5 MG/1; MG/1; MG/1; MG/1
30 TABLET ORAL 2 TIMES DAILY
Qty: 60 TABLET | Refills: 0 | Status: SHIPPED | OUTPATIENT
Start: 2024-09-18

## 2024-09-18 NOTE — TELEPHONE ENCOUNTER
Please see the attached refill request.  Pt is off today  Refill not due until tomorrow  Would like to  today if possible  Thanks

## 2024-10-02 ENCOUNTER — LAB VISIT (OUTPATIENT)
Dept: LAB | Facility: HOSPITAL | Age: 29
End: 2024-10-02
Attending: INTERNAL MEDICINE
Payer: COMMERCIAL

## 2024-10-02 ENCOUNTER — OFFICE VISIT (OUTPATIENT)
Dept: FAMILY MEDICINE | Facility: CLINIC | Age: 29
End: 2024-10-02
Payer: COMMERCIAL

## 2024-10-02 VITALS
BODY MASS INDEX: 27.21 KG/M2 | DIASTOLIC BLOOD PRESSURE: 66 MMHG | OXYGEN SATURATION: 98 % | SYSTOLIC BLOOD PRESSURE: 108 MMHG | HEIGHT: 59 IN | TEMPERATURE: 99 F | RESPIRATION RATE: 16 BRPM | HEART RATE: 78 BPM | WEIGHT: 135 LBS

## 2024-10-02 DIAGNOSIS — D64.9 ANEMIA, UNSPECIFIED TYPE: ICD-10-CM

## 2024-10-02 DIAGNOSIS — F90.9 ATTENTION DEFICIT HYPERACTIVITY DISORDER (ADHD), UNSPECIFIED ADHD TYPE: Chronic | ICD-10-CM

## 2024-10-02 DIAGNOSIS — F90.0 ATTENTION DEFICIT HYPERACTIVITY DISORDER (ADHD), PREDOMINANTLY INATTENTIVE TYPE: Primary | Chronic | ICD-10-CM

## 2024-10-02 DIAGNOSIS — Q85.01 NEUROFIBROMATOSIS, TYPE 1: ICD-10-CM

## 2024-10-02 DIAGNOSIS — J34.1 NASAL SINUS CYST: ICD-10-CM

## 2024-10-02 LAB
BASOPHILS # BLD AUTO: 0.02 X10(3)/MCL
BASOPHILS NFR BLD AUTO: 0.4 %
EOSINOPHIL # BLD AUTO: 0.06 X10(3)/MCL (ref 0–0.9)
EOSINOPHIL NFR BLD AUTO: 1.1 %
ERYTHROCYTE [DISTWIDTH] IN BLOOD BY AUTOMATED COUNT: 19.4 % (ref 11.5–17)
HCT VFR BLD AUTO: 33.5 % (ref 37–47)
HGB BLD-MCNC: 10.9 G/DL (ref 12–16)
IMM GRANULOCYTES # BLD AUTO: 0.01 X10(3)/MCL (ref 0–0.04)
IMM GRANULOCYTES NFR BLD AUTO: 0.2 %
LYMPHOCYTES # BLD AUTO: 1.79 X10(3)/MCL (ref 0.6–4.6)
LYMPHOCYTES NFR BLD AUTO: 31.5 %
MCH RBC QN AUTO: 24 PG (ref 27–31)
MCHC RBC AUTO-ENTMCNC: 32.5 G/DL (ref 33–36)
MCV RBC AUTO: 73.8 FL (ref 80–94)
MONOCYTES # BLD AUTO: 0.31 X10(3)/MCL (ref 0.1–1.3)
MONOCYTES NFR BLD AUTO: 5.4 %
NEUTROPHILS # BLD AUTO: 3.5 X10(3)/MCL (ref 2.1–9.2)
NEUTROPHILS NFR BLD AUTO: 61.4 %
NRBC BLD AUTO-RTO: 0 %
PLATELET # BLD AUTO: 264 X10(3)/MCL (ref 130–400)
PMV BLD AUTO: 9.6 FL (ref 7.4–10.4)
RBC # BLD AUTO: 4.54 X10(6)/MCL (ref 4.2–5.4)
WBC # BLD AUTO: 5.69 X10(3)/MCL (ref 4.5–11.5)

## 2024-10-02 PROCEDURE — 3008F BODY MASS INDEX DOCD: CPT | Mod: CPTII,,, | Performed by: INTERNAL MEDICINE

## 2024-10-02 PROCEDURE — 1160F RVW MEDS BY RX/DR IN RCRD: CPT | Mod: CPTII,,, | Performed by: INTERNAL MEDICINE

## 2024-10-02 PROCEDURE — 1159F MED LIST DOCD IN RCRD: CPT | Mod: CPTII,,, | Performed by: INTERNAL MEDICINE

## 2024-10-02 PROCEDURE — 3074F SYST BP LT 130 MM HG: CPT | Mod: CPTII,,, | Performed by: INTERNAL MEDICINE

## 2024-10-02 PROCEDURE — 85025 COMPLETE CBC W/AUTO DIFF WBC: CPT

## 2024-10-02 PROCEDURE — 3078F DIAST BP <80 MM HG: CPT | Mod: CPTII,,, | Performed by: INTERNAL MEDICINE

## 2024-10-02 PROCEDURE — 36415 COLL VENOUS BLD VENIPUNCTURE: CPT

## 2024-10-02 PROCEDURE — 99214 OFFICE O/P EST MOD 30 MIN: CPT | Mod: ,,, | Performed by: INTERNAL MEDICINE

## 2024-10-02 PROCEDURE — 3044F HG A1C LEVEL LT 7.0%: CPT | Mod: CPTII,,, | Performed by: INTERNAL MEDICINE

## 2024-10-02 RX ORDER — DEXTROAMPHETAMINE SACCHARATE, AMPHETAMINE ASPARTATE, DEXTROAMPHETAMINE SULFATE AND AMPHETAMINE SULFATE 7.5; 7.5; 7.5; 7.5 MG/1; MG/1; MG/1; MG/1
30 TABLET ORAL 2 TIMES DAILY
Qty: 60 TABLET | Refills: 0 | Status: SHIPPED | OUTPATIENT
Start: 2024-10-02

## 2024-10-02 RX ORDER — ONDANSETRON 4 MG/1
4 TABLET, ORALLY DISINTEGRATING ORAL EVERY 8 HOURS PRN
COMMUNITY
Start: 2024-09-20

## 2024-10-02 NOTE — PROGRESS NOTES
Subjective:      Patient ID: Yue Hickman is a 28 y.o. female.    Chief Complaint: ADHD    HPI  she is single, no children. 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     Neurofibromatosis type 1  Diagnosed at birth  She has had several fibromas removed from her breasts most recently thru derm  Brain MRI completed 7/3/24  FINDINGS:  The ventricles and sulci are normal in size and width respectively.  There is no midline shift or mass effect.  There is no diffusion restriction.     No signal abnormality is identified in the brain.     There are no extra-axial fluid collections.  The midline craniocervical structures are within normal limits.  The normal flow voids are maintained within the major intracranial vascular structures.  A 9 mm mucosal retention cyst or polyp is present in the left posterior ethmoid air cell.  Included paranasal sinuses are otherwise clear.  The mastoid air cells are well aerated.     Impression:     None unremarkable noncontrast MRI of the brain.     9 mm mucosal retention cyst or polyp in a left posterior ethmoid air cell.    -patient was referred to [facility] for NF- has not gotten in contact yet, plans to [facility] today to schedule appt     Anemia:  CBC with worsening h/h  Periods are controlled, she is on birth control  No other bleeding observed    GERD  Has seen GI for this  Diagnosed with esophagitis previously  Not on medication for this  Having more bloating/gas after eating meals       ADHD  Last refill 9/18/24   Controlled substance agreement signed 06/07/2023  New agree ordered today  Urine Drug Screen done June 2023 2024 drug screen ordered   Health Maintenance Due   Topic Date Due    TETANUS VACCINE  08/09/2017    Influenza Vaccine (1)  "09/01/2024    COVID-19 Vaccine (1 - 2024-25 season) Never done     Review of Systems   Constitutional:  Positive for fatigue. Negative for chills and fever.   HENT:  Negative for congestion, sinus pressure and sore throat.    Respiratory:  Negative for cough and shortness of breath.    Cardiovascular:  Negative for chest pain and palpitations.   Gastrointestinal:  Negative for abdominal pain and nausea.   Skin:  Negative for rash.       Objective:     Vitals:    10/02/24 0819   BP: 108/66   BP Location: Right arm   Patient Position: Sitting   Pulse: 78   Resp: 16   Temp: 98.8 °F (37.1 °C)   TempSrc: Temporal   SpO2: 98%   Weight: 61.2 kg (135 lb)   Height: 4' 11" (1.499 m)     Physical Exam  Vitals and nursing note reviewed.   Constitutional:       General: She is not in acute distress.     Appearance: Normal appearance. She is not ill-appearing.   Neurological:      Mental Status: She is alert.       Assessment/Plan:     1. Attention deficit hyperactivity disorder (ADHD), predominantly inattentive type  -     Drug Screen, Urine; Future; Expected date: 10/02/2024  PMDP reviewed  Controlled agreement signed  Uds ordered  Refills sent  2. Nasal sinus cyst  -     Ambulatory referral/consult to ENT; Future; Expected date: 10/09/2024  Referral requested  3. Anemia, unspecified type  -     Iron and TIBC; Future; Expected date: 10/02/2024  -     Ferritin; Future; Expected date: 10/02/2024  -     Folate; Future; Expected date: 10/02/2024  -     Vitamin B12; Future; Expected date: 10/02/2024  -     Reticulocytes; Future; Expected date: 10/02/2024  Add on labs to evaluate anemia    4. Neurofibromatosis, type 1  Unchanged  Patient to call clinic in [place] to schedule appt, she has contact info     Follow up in about 3 months (around 1/2/2025) for Follow Up - Controlled Med.    I spent a total of 20 minutes on the day of the visit.This includes face to face time and non-face to face time preparing to see the patient (eg, " review of tests), obtaining and/or reviewing separately obtained history, documenting clinical information in the electronic or other health record, independently interpreting results and communicating results to the patient/family/caregiver, or care coordinator.                       no

## 2024-10-04 ENCOUNTER — PATIENT MESSAGE (OUTPATIENT)
Dept: FAMILY MEDICINE | Facility: CLINIC | Age: 29
End: 2024-10-04
Payer: COMMERCIAL

## 2024-10-04 ENCOUNTER — TELEPHONE (OUTPATIENT)
Dept: FAMILY MEDICINE | Facility: CLINIC | Age: 29
End: 2024-10-04
Payer: COMMERCIAL

## 2024-10-04 NOTE — TELEPHONE ENCOUNTER
----- Message from Bakari sent at 10/4/2024  7:35 AM CDT -----  Who Called: Yue Vick    Caller is requesting assistance/information from provider's office.    Symptoms (please be specific):    How long has patient had these symptoms:    List of preferred pharmacies on file (remove unneeded): [unfilled]  If different, enter pharmacy into here including location and phone number:         Patient's Preferred Phone Number on File: [phone]   Best Call Back Number, if different:  Additional Information pt saw a PT therapist  would like to determine if work accommodations were received. Pt also req she is excused for an hour twice a week from work, to attend  PT sessions. please follow up to confirm/discuss.

## 2024-10-08 NOTE — TELEPHONE ENCOUNTER
See below  Forms printed and placed on Dr Alfonso's desk for dignature      I believe these are the forms,  I'd need filled out in order to get  Accommodations and intermediate absences to attend physical therapy twice a week.   One of the doctors said they would be reaching out to you with the Examination results from my appointment on Wednesday.

## 2024-10-10 ENCOUNTER — PATIENT MESSAGE (OUTPATIENT)
Dept: FAMILY MEDICINE | Facility: CLINIC | Age: 29
End: 2024-10-10
Payer: COMMERCIAL

## 2024-10-15 ENCOUNTER — TELEPHONE (OUTPATIENT)
Dept: FAMILY MEDICINE | Facility: CLINIC | Age: 29
End: 2024-10-15
Payer: COMMERCIAL

## 2024-10-15 NOTE — TELEPHONE ENCOUNTER
Spoke with pt   Pt requesting [facility] paperwork to be faxed to [employer] at the fax number provided  Routed [place] paperwork to number provided  I did advise pt that the form we have scanned in yusuf not have her signature so she may need to resend her copy if they need her signature  Pt expressed understanding to all of the above  Thanks

## 2024-10-15 NOTE — TELEPHONE ENCOUNTER
----- Message from Som sent at 10/14/2024  2:37 PM CDT -----  .Who Called: Yue Hickman    Caller is requesting assistance/information from provider's office.    Symptoms (please be specific):    How long has patient had these symptoms:    List of preferred pharmacies on file (remove unneeded):       Preferred Method of Contact: Phone Call  Patient's Preferred Phone Number on File: [phone]   Best Call Back Number, if different:  Additional Information: pt called requesting to have accommodations faxed to [employer] Fax:[phone]

## 2024-10-16 ENCOUNTER — PATIENT MESSAGE (OUTPATIENT)
Dept: FAMILY MEDICINE | Facility: CLINIC | Age: 29
End: 2024-10-16
Payer: COMMERCIAL

## 2024-10-16 ENCOUNTER — LAB VISIT (OUTPATIENT)
Dept: LAB | Facility: HOSPITAL | Age: 29
End: 2024-10-16
Attending: INTERNAL MEDICINE
Payer: COMMERCIAL

## 2024-10-16 DIAGNOSIS — F90.0 ATTENTION DEFICIT HYPERACTIVITY DISORDER (ADHD), PREDOMINANTLY INATTENTIVE TYPE: Chronic | ICD-10-CM

## 2024-10-16 DIAGNOSIS — D64.9 ANEMIA, UNSPECIFIED TYPE: Primary | ICD-10-CM

## 2024-10-16 DIAGNOSIS — M54.9 DORSALGIA, UNSPECIFIED: ICD-10-CM

## 2024-10-16 DIAGNOSIS — D64.9 ANEMIA, UNSPECIFIED TYPE: ICD-10-CM

## 2024-10-16 LAB
AMPHET UR QL SCN: NEGATIVE
BARBITURATE SCN PRESENT UR: NEGATIVE
BENZODIAZ UR QL SCN: NEGATIVE
CANNABINOIDS UR QL SCN: NEGATIVE
COCAINE UR QL SCN: NEGATIVE
FENTANYL UR QL SCN: NEGATIVE
FERRITIN SERPL-MCNC: 68.06 NG/ML (ref 4.63–204)
FOLATE SERPL-MCNC: 12.3 NG/ML (ref 7–31.4)
IRON SATN MFR SERPL: ABNORMAL %
IRON SERPL-MCNC: 316 UG/DL (ref 50–170)
MDMA UR QL SCN: NEGATIVE
OPIATES UR QL SCN: NEGATIVE
PCP UR QL: NEGATIVE
PH UR: 7 [PH] (ref 3–11)
RET# (OHS): 0.07 X10E6/UL (ref 0.02–0.08)
RETICULOCYTE COUNT AUTOMATED (OLG): 1.43 % (ref 1.1–2.1)
SPECIFIC GRAVITY, URINE AUTO (.000) (OHS): 1.02 (ref 1–1.03)
TIBC SERPL-MCNC: <25 UG/DL (ref 70–310)
TIBC SERPL-MCNC: ABNORMAL UG/DL
TRANSFERRIN SERPL-MCNC: 226 MG/DL (ref 180–382)
VIT B12 SERPL-MCNC: 869 PG/ML (ref 213–816)

## 2024-10-16 PROCEDURE — 83540 ASSAY OF IRON: CPT

## 2024-10-16 PROCEDURE — 82728 ASSAY OF FERRITIN: CPT

## 2024-10-16 PROCEDURE — 36415 COLL VENOUS BLD VENIPUNCTURE: CPT

## 2024-10-16 PROCEDURE — 82746 ASSAY OF FOLIC ACID SERUM: CPT

## 2024-10-16 PROCEDURE — 80307 DRUG TEST PRSMV CHEM ANLYZR: CPT

## 2024-10-16 PROCEDURE — 82607 VITAMIN B-12: CPT

## 2024-10-16 PROCEDURE — 83550 IRON BINDING TEST: CPT

## 2024-10-16 PROCEDURE — 85045 AUTOMATED RETICULOCYTE COUNT: CPT

## 2024-10-17 ENCOUNTER — PATIENT MESSAGE (OUTPATIENT)
Dept: FAMILY MEDICINE | Facility: CLINIC | Age: 29
End: 2024-10-17
Payer: COMMERCIAL

## 2024-10-17 NOTE — TELEPHONE ENCOUNTER
Pt is amendable to hematology referral   See below      Also, the physical therapist is requesting that I get an MRI done on my spine just in case something is wonky going on and that's what's causing my pain .    I was trying to wait until I got referred to the neurologist for NF, but they have not reached out to me.

## 2024-10-21 ENCOUNTER — OFFICE VISIT (OUTPATIENT)
Dept: HEMATOLOGY/ONCOLOGY | Facility: CLINIC | Age: 29
End: 2024-10-21
Payer: COMMERCIAL

## 2024-10-21 ENCOUNTER — TELEPHONE (OUTPATIENT)
Dept: FAMILY MEDICINE | Facility: CLINIC | Age: 29
End: 2024-10-21
Payer: COMMERCIAL

## 2024-10-21 VITALS
RESPIRATION RATE: 18 BRPM | SYSTOLIC BLOOD PRESSURE: 132 MMHG | WEIGHT: 133.5 LBS | HEART RATE: 85 BPM | TEMPERATURE: 98 F | DIASTOLIC BLOOD PRESSURE: 89 MMHG | OXYGEN SATURATION: 100 % | BODY MASS INDEX: 26.91 KG/M2 | HEIGHT: 59 IN

## 2024-10-21 DIAGNOSIS — M54.9 DORSALGIA, UNSPECIFIED: Primary | ICD-10-CM

## 2024-10-21 DIAGNOSIS — D64.9 ANEMIA, UNSPECIFIED TYPE: ICD-10-CM

## 2024-10-21 DIAGNOSIS — M89.9 LESION OF BONE OF THORACIC SPINE: ICD-10-CM

## 2024-10-21 DIAGNOSIS — D50.9 MICROCYTIC ANEMIA: Primary | ICD-10-CM

## 2024-10-21 DIAGNOSIS — Q85.01 NEUROFIBROMATOSIS, TYPE 1: ICD-10-CM

## 2024-10-21 LAB
ALBUMIN SERPL-MCNC: 3.9 G/DL (ref 3.5–5)
ALBUMIN/GLOB SERPL: 1.3 RATIO (ref 1.1–2)
ALP SERPL-CCNC: 41 UNIT/L (ref 40–150)
ALT SERPL-CCNC: 9 UNIT/L (ref 0–55)
ANION GAP SERPL CALC-SCNC: 6 MEQ/L
AST SERPL-CCNC: 12 UNIT/L (ref 5–34)
BASOPHILS # BLD AUTO: 0.02 X10(3)/MCL
BASOPHILS NFR BLD AUTO: 0.4 %
BILIRUB SERPL-MCNC: 0.9 MG/DL
BUN SERPL-MCNC: 12.8 MG/DL (ref 7–18.7)
CALCIUM SERPL-MCNC: 9.3 MG/DL (ref 8.4–10.2)
CHLORIDE SERPL-SCNC: 110 MMOL/L (ref 98–107)
CO2 SERPL-SCNC: 25 MMOL/L (ref 22–29)
CREAT SERPL-MCNC: 0.69 MG/DL (ref 0.55–1.02)
CREAT/UREA NIT SERPL: 19
EOSINOPHIL # BLD AUTO: 0.06 X10(3)/MCL (ref 0–0.9)
EOSINOPHIL NFR BLD AUTO: 1.1 %
ERYTHROCYTE [DISTWIDTH] IN BLOOD BY AUTOMATED COUNT: 19 % (ref 11.5–17)
FERRITIN SERPL-MCNC: 67.94 NG/ML (ref 4.63–204)
FOLATE SERPL-MCNC: 12.9 NG/ML (ref 7–31.4)
GFR SERPLBLD CREATININE-BSD FMLA CKD-EPI: >60 ML/MIN/1.73/M2
GLOBULIN SER-MCNC: 3.1 GM/DL (ref 2.4–3.5)
GLUCOSE SERPL-MCNC: 88 MG/DL (ref 74–100)
HCT VFR BLD AUTO: 35.2 % (ref 37–47)
HGB BLD-MCNC: 11.5 G/DL (ref 12–16)
IMM GRANULOCYTES # BLD AUTO: 0 X10(3)/MCL (ref 0–0.04)
IMM GRANULOCYTES NFR BLD AUTO: 0 %
IRON SATN MFR SERPL: 64 % (ref 20–50)
IRON SERPL-MCNC: 181 UG/DL (ref 50–170)
LYMPHOCYTES # BLD AUTO: 1.67 X10(3)/MCL (ref 0.6–4.6)
LYMPHOCYTES NFR BLD AUTO: 30.8 %
MCH RBC QN AUTO: 23.7 PG (ref 27–31)
MCHC RBC AUTO-ENTMCNC: 32.7 G/DL (ref 33–36)
MCV RBC AUTO: 72.6 FL (ref 80–94)
MONOCYTES # BLD AUTO: 0.34 X10(3)/MCL (ref 0.1–1.3)
MONOCYTES NFR BLD AUTO: 6.3 %
NEUTROPHILS # BLD AUTO: 3.33 X10(3)/MCL (ref 2.1–9.2)
NEUTROPHILS NFR BLD AUTO: 61.4 %
PLATELET # BLD AUTO: 282 X10(3)/MCL (ref 130–400)
PMV BLD AUTO: 9.8 FL (ref 7.4–10.4)
POTASSIUM SERPL-SCNC: 5 MMOL/L (ref 3.5–5.1)
PROT SERPL-MCNC: 7 GM/DL (ref 6.4–8.3)
RBC # BLD AUTO: 4.85 X10(6)/MCL (ref 4.2–5.4)
RET# (OHS): 0.08 X10E6/UL (ref 0.02–0.08)
RETICULOCYTE COUNT AUTOMATED (OLG): 1.57 % (ref 1.1–2.1)
SODIUM SERPL-SCNC: 141 MMOL/L (ref 136–145)
TIBC SERPL-MCNC: 103 UG/DL (ref 70–310)
TIBC SERPL-MCNC: 284 UG/DL (ref 250–450)
TRANSFERRIN SERPL-MCNC: 245 MG/DL (ref 180–382)
VIT B12 SERPL-MCNC: 677 PG/ML (ref 213–816)
WBC # BLD AUTO: 5.42 X10(3)/MCL (ref 4.5–11.5)

## 2024-10-21 PROCEDURE — 99999 PR PBB SHADOW E&M-EST. PATIENT-LVL IV: CPT | Mod: PBBFAC,,, | Performed by: INTERNAL MEDICINE

## 2024-10-21 PROCEDURE — 3079F DIAST BP 80-89 MM HG: CPT | Mod: CPTII,S$GLB,, | Performed by: INTERNAL MEDICINE

## 2024-10-21 PROCEDURE — 3008F BODY MASS INDEX DOCD: CPT | Mod: CPTII,S$GLB,, | Performed by: INTERNAL MEDICINE

## 2024-10-21 PROCEDURE — 86039 ANTINUCLEAR ANTIBODIES (ANA): CPT | Performed by: INTERNAL MEDICINE

## 2024-10-21 PROCEDURE — 86334 IMMUNOFIX E-PHORESIS SERUM: CPT | Performed by: INTERNAL MEDICINE

## 2024-10-21 PROCEDURE — 82607 VITAMIN B-12: CPT | Performed by: INTERNAL MEDICINE

## 2024-10-21 PROCEDURE — 86225 DNA ANTIBODY NATIVE: CPT | Performed by: INTERNAL MEDICINE

## 2024-10-21 PROCEDURE — 36415 COLL VENOUS BLD VENIPUNCTURE: CPT | Performed by: INTERNAL MEDICINE

## 2024-10-21 PROCEDURE — 3075F SYST BP GE 130 - 139MM HG: CPT | Mod: CPTII,S$GLB,, | Performed by: INTERNAL MEDICINE

## 2024-10-21 PROCEDURE — 85025 COMPLETE CBC W/AUTO DIFF WBC: CPT | Performed by: INTERNAL MEDICINE

## 2024-10-21 PROCEDURE — 83550 IRON BINDING TEST: CPT | Performed by: INTERNAL MEDICINE

## 2024-10-21 PROCEDURE — 80053 COMPREHEN METABOLIC PANEL: CPT | Performed by: INTERNAL MEDICINE

## 2024-10-21 PROCEDURE — 81256 HFE GENE: CPT | Performed by: INTERNAL MEDICINE

## 2024-10-21 PROCEDURE — 82390 ASSAY OF CERULOPLASMIN: CPT | Performed by: INTERNAL MEDICINE

## 2024-10-21 PROCEDURE — 83020 HEMOGLOBIN ELECTROPHORESIS: CPT | Performed by: INTERNAL MEDICINE

## 2024-10-21 PROCEDURE — 85045 AUTOMATED RETICULOCYTE COUNT: CPT | Performed by: INTERNAL MEDICINE

## 2024-10-21 PROCEDURE — 83540 ASSAY OF IRON: CPT | Performed by: INTERNAL MEDICINE

## 2024-10-21 PROCEDURE — 82728 ASSAY OF FERRITIN: CPT | Performed by: INTERNAL MEDICINE

## 2024-10-21 PROCEDURE — 83021 HEMOGLOBIN CHROMOTOGRAPHY: CPT | Performed by: INTERNAL MEDICINE

## 2024-10-21 PROCEDURE — 84165 PROTEIN E-PHORESIS SERUM: CPT | Performed by: INTERNAL MEDICINE

## 2024-10-21 PROCEDURE — 3044F HG A1C LEVEL LT 7.0%: CPT | Mod: CPTII,S$GLB,, | Performed by: INTERNAL MEDICINE

## 2024-10-21 PROCEDURE — 86235 NUCLEAR ANTIGEN ANTIBODY: CPT | Performed by: INTERNAL MEDICINE

## 2024-10-21 PROCEDURE — 82746 ASSAY OF FOLIC ACID SERUM: CPT | Performed by: INTERNAL MEDICINE

## 2024-10-21 PROCEDURE — 99204 OFFICE O/P NEW MOD 45 MIN: CPT | Mod: S$GLB,,, | Performed by: INTERNAL MEDICINE

## 2024-10-21 RX ORDER — GABAPENTIN 100 MG/1
100 CAPSULE ORAL NIGHTLY
Qty: 30 CAPSULE | Refills: 2 | Status: SHIPPED | OUTPATIENT
Start: 2024-10-21 | End: 2025-10-21

## 2024-10-21 NOTE — TELEPHONE ENCOUNTER
----- Message from Papo sent at 10/21/2024  9:23 AM CDT -----  .Type:  Needs Medical Advice    Who Called: Yue  Symptoms (please be specific):    How long has patient had these symptoms:    Pharmacy name and phone #:    Would the patient rather a call back or a response via [facility]?   Best Call Back Number: [phone]  Additional Information: Patient requested to speak with the nurse re:  results of an MRI of her mental state. Please call when available. Thanks.

## 2024-10-21 NOTE — TELEPHONE ENCOUNTER
"Spoke with pt   Stated that her job denied her leave due to lifting up to 49 lbs being in her job description and they do not have another place to put her   Went to apply for a mental health leave then had a "melt down" and was placed on mental health leave by her job  Needs documentation stating that pts "mental health is not the best"  Also wanting to get on some type of nerve pain medication such as gabapentin that she was previously on   See note sent through pt portal  Thanks     Of note:  Pt stated she was able to get in touch with clinic in [place] for neuro would not be able to see her until 05/2025     "

## 2024-10-21 NOTE — TELEPHONE ENCOUNTER
----- Message from Rosie Alfonso MD sent at 10/18/2024  8:41 AM CDT -----  Please call patient  MRI of the lower back did not show any disc bulging or areas of pinched nerves.  However she has a small growth that is in the area compressing her nerves at the top of the lower back (between the mid and lower spine). I do recommend we get some better imaging for this- MRI thoracic spine AND that we work on a referral to our neurosurgeon to determine if this is contributing to her symptoms or if this needs to be monitored with repeat imaging OR if this needs to be removed now.   If agreeable to the imaging and referral, I will order this.  Thanks

## 2024-10-21 NOTE — TELEPHONE ENCOUNTER
I have signed for the following orders AND/OR meds. Please notify the patient and ask the patient to schedule the testing and/or information about any medications that were sent.         Orders Placed This Encounter   Procedures    MRI Thoracic Spine W WO Contrast     Standing Status:   Future     Standing Expiration Date:   10/21/2025     Order Specific Question:   Does the patient have or ever had a pacemaker or a defibrillator (Note: Some facilities may not be able to schedule an MRI for patients with pacemakers and defibrillators. You should contact your local radiology dept to determine if this is the case.)?     Answer:   No     Order Specific Question:   Does the patient have an aneurysm or surgical clip, pump, nerve/brain stimulator, middle/inner ear prosthesis, or other metal implant or foreign object (bullet, shrapnel)? If they have a card related to their implant, ask them to bring it. Issues related to the implant may cause the MRI to be delayed.     Answer:   No     Order Specific Question:   Will the patient require po anxiolysis or sedation?     Answer:   No     Order Specific Question:   May the Radiologist modify the order per protocol to meet the clinical needs of the patient?     Answer:   Yes     Order Specific Question:   Recist criteria?     Answer:   Yes     Order Specific Question:   Does the patient have on a skin patch for medication with aluminized backing?     Answer:   No     Order Specific Question:   [facility] ONLY: Performing/Resulting Location     Answer:   [facility]    Ambulatory referral/consult to Neurosurgery     Standing Status:   Future     Standing Expiration Date:   11/21/2025     Referral Priority:   Routine     Referral Type:   Consultation     Referral Reason:   Specialty Services Required     Referred to Provider:   Alyson Ward MD     Requested Specialty:   Neurosurgery     Number of Visits Requested:   1

## 2024-10-21 NOTE — PROGRESS NOTES
HEMATOLOGY/ONCOLOGY OFFICE CLINIC VISIT    Visit Information:    Initial Evaluation: 10/21/2024  Referring Provider:   Other providers:  Code status:Not addressed    Diagnosis:  Microcytic anemia  Elevated ferritin    Present treatment:    Treatment/Oncology history:    Goal of care:     Imaging:    Pathology:      CLINICAL HISTORY:       Patient: Yue Hickman is a 28 y.o. female with H/o neurofribromatosis, type 1 kindly referred for Microcytic anemia and Elevated ferritin.     Patient is doing relatively well and besides fatigue and tiredness she is doing well. She is not taking iron. No family h/o blood disorders. No recent illness or hospitalizations.  She stated that she took the DNA- test from  and me that showed that she has hemochromatosis. She is not sure how reliable this test is.     Chief Complaint: New Patient (NP referred by Dr. Rosie Alfonso for Anemia.//PT states she been fatigue a lot lately.)      Interval History:        Past Medical History:   Diagnosis Date    ADHD (attention deficit hyperactivity disorder)     Esophagitis     GERD (gastroesophageal reflux disease)     Neurofibromatosis, type 1 6/7/2023      Past Surgical History:   Procedure Laterality Date    NEUROFIBROMA EXCISION      SINUS SURGERY      TONSILLECTOMY       Family History   Problem Relation Name Age of Onset    Diabetes Mother      Hypertension Mother      Cancer Father            Review of patient's allergies indicates:  No Known Allergies   Current Outpatient Medications on File Prior to Visit   Medication Sig Dispense Refill    dextroamphetamine-amphetamine 30 mg Tab Take 1 tablet (30 mg total) by mouth 2 (two) times a day. 60 tablet 0    dextroamphetamine-amphetamine 30 mg Tab Take 1 tablet (30 mg total) by mouth 2 (two) times a day. 60 tablet 0    dextroamphetamine-amphetamine 30 mg Tab Take 1 tablet (30 mg total) by mouth 2 (two) times a day. 60 tablet 0    etonogestreL-ethinyl estradioL (NUVARING) 0.12-0.015 mg/24 hr  vaginal ring Place vaginally.      gabapentin (NEURONTIN) 100 MG capsule Take 1 capsule (100 mg total) by mouth every evening. 30 capsule 2    ondansetron (ZOFRAN-ODT) 4 MG TbDL Take 4 mg by mouth every 8 (eight) hours as needed.       No current facility-administered medications on file prior to visit.      Review of Systems   Constitutional:  Positive for fatigue. Negative for activity change, appetite change, chills, fever and unexpected weight change.   HENT:  Negative for mouth dryness, mouth sores, nosebleeds, sore throat and trouble swallowing.    Eyes:  Negative for visual disturbance.   Respiratory:  Negative for cough and shortness of breath.    Cardiovascular:  Negative for chest pain, palpitations and leg swelling.   Gastrointestinal:  Negative for abdominal distention, abdominal pain, blood in stool, change in bowel habit, constipation, diarrhea, nausea and vomiting.   Endocrine: Negative.    Genitourinary:  Negative for dysuria, frequency, hematuria and urgency.   Musculoskeletal:  Positive for back pain. Negative for arthralgias, myalgias and neck pain.   Integumentary:  Negative for rash.   Neurological:  Negative for dizziness, tremors, syncope, speech difficulty, weakness, light-headedness, numbness, headaches and memory loss.   Hematological:  Negative for adenopathy. Does not bruise/bleed easily.   Psychiatric/Behavioral:  Negative for confusion and suicidal ideas. The patient is nervous/anxious.               Vitals:    10/21/24 1313   Weight: 60.6 kg (133 lb 8 oz)      Wt Readings from Last 6 Encounters:   10/21/24 60.6 kg (133 lb 8 oz)   10/02/24 61.2 kg (135 lb)   07/03/24 60.9 kg (134 lb 4.8 oz)   03/20/24 61 kg (134 lb 6.4 oz)   12/06/23 62.2 kg (137 lb 1.6 oz)   11/01/23 63.5 kg (140 lb)     Body mass index is 26.96 kg/m².  Body surface area is 1.59 meters squared.  Physical Exam  Vitals and nursing note reviewed.   Constitutional:       General: She is not in acute distress.      Appearance: Normal appearance. She is well-developed.   HENT:      Head: Normocephalic and atraumatic.      Mouth/Throat:      Mouth: Mucous membranes are moist.   Eyes:      General: No scleral icterus.     Extraocular Movements: Extraocular movements intact.      Conjunctiva/sclera: Conjunctivae normal.      Pupils: Pupils are equal, round, and reactive to light.   Neck:      Vascular: No JVD.   Cardiovascular:      Rate and Rhythm: Normal rate and regular rhythm.      Heart sounds: No murmur heard.  Pulmonary:      Effort: Pulmonary effort is normal.      Breath sounds: Normal breath sounds. No wheezing or rhonchi.   Abdominal:      General: Bowel sounds are normal. There is no distension.      Palpations: Abdomen is soft. There is no mass.      Tenderness: There is no abdominal tenderness.   Musculoskeletal:         General: No swelling or deformity.      Cervical back: Neck supple.   Lymphadenopathy:      Head:      Right side of head: No submandibular adenopathy.      Left side of head: No submandibular adenopathy.      Cervical: No cervical adenopathy.      Upper Body:      Right upper body: No supraclavicular or axillary adenopathy.      Left upper body: No supraclavicular or axillary adenopathy.      Lower Body: No right inguinal adenopathy. No left inguinal adenopathy.   Skin:     General: Skin is warm.      Coloration: Skin is not jaundiced.      Findings: No lesion or rash.      Nails: There is no clubbing.   Neurological:      General: No focal deficit present.      Mental Status: She is alert and oriented to person, place, and time.      Cranial Nerves: Cranial nerves 2-12 are intact.   Psychiatric:         Attention and Perception: Attention normal.         Behavior: Behavior is cooperative.         Judgment: Judgment normal.       Laboratory:  CBC with Differential:  Lab Results   Component Value Date    WBC 5.69 10/02/2024    RBC 4.54 10/02/2024    HGB 10.9 (L) 10/02/2024    HCT 33.5 (L) 10/02/2024     MCV 73.8 (L) 10/02/2024    MCH 24.0 (L) 10/02/2024    MCHC 32.5 (L) 10/02/2024    RDW 19.4 (H) 10/02/2024     10/02/2024    MPV 9.6 10/02/2024        CMP:  Sodium   Date Value Ref Range Status   07/03/2024 139 136 - 145 mmol/L Final     Potassium   Date Value Ref Range Status   07/03/2024 3.9 3.5 - 5.1 mmol/L Final     Chloride   Date Value Ref Range Status   07/03/2024 107 98 - 107 mmol/L Final     CO2   Date Value Ref Range Status   07/03/2024 24 22 - 29 mmol/L Final     Blood Urea Nitrogen   Date Value Ref Range Status   07/03/2024 16.2 7.0 - 18.7 mg/dL Final     Creatinine   Date Value Ref Range Status   07/03/2024 0.77 0.55 - 1.02 mg/dL Final     Calcium   Date Value Ref Range Status   07/03/2024 9.3 8.4 - 10.2 mg/dL Final     Albumin   Date Value Ref Range Status   07/03/2024 4.1 3.5 - 5.0 g/dL Final     Bilirubin Total   Date Value Ref Range Status   07/03/2024 0.9 <=1.5 mg/dL Final     ALP   Date Value Ref Range Status   07/03/2024 33 (L) 40 - 150 unit/L Final     AST   Date Value Ref Range Status   07/03/2024 13 5 - 34 unit/L Final     ALT   Date Value Ref Range Status   07/03/2024 11 0 - 55 unit/L Final     Estimated GFR-Non    Date Value Ref Range Status   03/30/2022 >60               Assessment:       1. Microcytic anemia    2. Anemia, unspecified type      Eval for iron overload and hemochromatosis  She stated that she took the DNA- test from  and me that showed that she has hemochromatosis. She is not sure how reliable this test is.         Plan:       Anemia workup  Will repeat iron studies  Serum Copper and ceruloplasmin levels  Peripheral smear for review.    RTC 2 weeks to discuss results  The patient was seen, interviewed and examined. Pertinent lab and radiology studies were reviewed.   The patient was given ample opportunity to ask questions, and to the best of my abilities, all questions answered to satisfaction; patient demonstrated understanding of what we  discussed and agreeable to the plan. Pt instructed to call should develop concerning signs/symptoms or have further questions.     I'd like to thank for referring and allowing me the opportunity to participate in the care of this patient and if any questions, please do not hesitate to call the office at [phone].         Alda Drummond MD  Hematology/Oncology

## 2024-10-21 NOTE — TELEPHONE ENCOUNTER
Spoke with pt  Results reviewed  Advised that MRI showed pt has a small growth that is in the area compressing her nerves at the top of the lower back (between the mid and lower spine).  Dr Alfonso does recommend we get some better imaging for this- MRI thoracic spine AND that we work on a referral to our neurosurgeon to determine if this is contributing to her symptoms or if this needs to be monitored with repeat imaging OR if this needs to be removed now.  Pt expressed understanding to all of the above  Pt is amendable with additional imaging & referral  Thanks

## 2024-10-21 NOTE — TELEPHONE ENCOUNTER
"Spoke with pt  Stated that she spoke with HR already and leave was denied due to a discrepancy  Pt stated that they told her they did not have any position to move her to to accommodate her requests also stated she felt like she was being discriminated against   Pt was advised to apply for a mental health leave and when she was filling out this paperwork she had a "meltdown" and was placed on Mental health leave by her job    Pt needing paperwork filled out for Mental Health Leave (Per Dr Alfonso pt needs an appt)  Pt made aware of referral to neurosurgeon and is amendable   Thanks     "

## 2024-10-21 NOTE — TELEPHONE ENCOUNTER
See below  Spoke with pt stated that her job denied her leave due to lifting up to 49 lbs being in her job description and they do not have another place to put her   Placed on mental health leave by her job  Needs documentation stating that mental health is not the best   Pt stated she was able to get in touch with clinic in [place] for neuro would not be able to see her until 05/2025   Also wanting to get on some type of nerve pain medication such as gabapentin that she was previously on   Pt stated she is available for an appt if need be  Please advise recommendations moving forward        They denied my accommodation I think it's because of the timeframe for the restrictions of the bending and sitting. (30mins )   If I could get you to resubmit it, but with a (three hour timeframe )  I'm gonna try to talk to Them tomorrow and get the details on why they Denied the request.     Thank you so much !    If they continue to deny it, I'm going to try to see if I can go on a medical leave and I might have to get you to approve that.   Because with these low energy levels that I have right now and the pain in my back, it's honestly been really depressing being at work.   and it's starting to affect my mental health along with my physical health.

## 2024-10-21 NOTE — TELEPHONE ENCOUNTER
Please clarify:  Does she want me to fill out paper work again but change it to time frame she is requesting?  Does she want to speak to her HR and now file FMLA for depression and anxiety? If so, she will need OV to discuss management of anxiety/depression as this will require discussion and physical examination.  Regarding medication, I can give her a Rx for gabapentin to use in the evening for the nerve pain in the back, I will send now  Regarding referral: patient did MRI of  L spine that did show some abnormalities at the level of T12, I recommended a neurosurgery evaluation to determine if this was causing her pain down the leg or if any intervention needed to be done. This would be a local referral to neurosurgery. If she agreeable to this referral?  Regarding the referral to the neurologist who specializes if neurofibromatosis, we have no way to influence that appointment time provided to her        I have signed for the following orders AND/OR meds. Please notify the patient and ask the patient to schedule the testing and/or information about any medications that were sent.      Medications Ordered This Encounter   Medications    gabapentin (NEURONTIN) 100 MG capsule     Sig: Take 1 capsule (100 mg total) by mouth every evening.     Dispense:  30 capsule     Refill:  2     No orders of the defined types were placed in this encounter.

## 2024-10-22 LAB
ALBUMIN % SPEP (OHS): 50.03 (ref 48.1–59.5)
ALBUMIN SERPL-MCNC: 3.5 G/DL (ref 3.5–5)
ALBUMIN/GLOB SERPL: 1 RATIO (ref 1.1–2)
ALPHA 1 GLOB (OHS): 0.31 GM/DL (ref 0–0.4)
ALPHA 1 GLOB% (OHS): 4.37 (ref 2.3–4.9)
ALPHA 2 GLOB % (OHS): 9.88 (ref 6.9–13)
ALPHA 2 GLOB (OHS): 0.69 GM/DL (ref 0.4–1)
BETA GLOB (OHS): 1.12 GM/DL (ref 0.7–1.3)
BETA GLOB% (OHS): 16.02 (ref 13.8–19.7)
GAMMA GLOBULIN % (OHS): 19.7 (ref 10.1–21.9)
GAMMA GLOBULIN (OHS): 1.38 GM/DL (ref 0.4–1.8)
GLOBULIN SER-MCNC: 3.5 GM/DL (ref 2.4–3.5)
HEMATOLOGIST REVIEW: NORMAL
M SPIKE % (OHS): ABNORMAL
M SPIKE (OHS): ABNORMAL
PATH REV: NORMAL
PROT SERPL-MCNC: 7 GM/DL (ref 6.4–8.3)

## 2024-10-23 ENCOUNTER — TELEPHONE (OUTPATIENT)
Dept: NEUROSURGERY | Facility: CLINIC | Age: 29
End: 2024-10-23
Payer: COMMERCIAL

## 2024-10-23 LAB
ANTINUCLEAR ANTIBODY SCREEN (OHS): NEGATIVE
CENTROMERE QUANT (OHS): <0.4 U/ML
CERULOPLASMIN SERPL-MCNC: 40.1 MG/DL (ref 20–51)
DSDNA AB QUANT (OHS): 1.4 IU/ML
JO-1 AB QUANT (OHS): <0.3 U/ML
RNP70 AB QUANT (OHS): 2.1 U/ML
SCL-70S AB QUANT (OHS): 0.9 U/ML
SMITH AB QUANT (OHS): <0.7 U/ML
SSA(RO) AB QUANT (OHS): 0.4 U/ML
SSB(LA) AB QUANT (OHS): <0.4 U/ML
U1RNP AB QUANT (OHS): 1.7 U/ML

## 2024-10-23 NOTE — TELEPHONE ENCOUNTER
Patient referred to Dr. Ward by Dr. Rosie Alfonso for dorsalgia and type 1 neurofibromatosis.     Patient had MRI of lumbar spine on 10/17/24. 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 This could be better evaluated with a dedicated MRI of the thoracolumbar junction, with and without contrast.   Patient scheduled for a MRI of thoracic spine on 10/29/24.    Patient had a MRI of the brain on 7/10/24. Impression:  9 mm mucosal retention cyst or polyp in a left posterior ethmoid air cell.     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

## 2024-10-24 ENCOUNTER — LAB VISIT (OUTPATIENT)
Dept: LAB | Facility: HOSPITAL | Age: 29
End: 2024-10-24
Attending: INTERNAL MEDICINE
Payer: COMMERCIAL

## 2024-10-24 ENCOUNTER — OFFICE VISIT (OUTPATIENT)
Dept: FAMILY MEDICINE | Facility: CLINIC | Age: 29
End: 2024-10-24
Payer: COMMERCIAL

## 2024-10-24 VITALS
HEIGHT: 59 IN | WEIGHT: 134.31 LBS | RESPIRATION RATE: 18 BRPM | HEART RATE: 90 BPM | BODY MASS INDEX: 27.08 KG/M2 | SYSTOLIC BLOOD PRESSURE: 122 MMHG | TEMPERATURE: 98 F | DIASTOLIC BLOOD PRESSURE: 78 MMHG | OXYGEN SATURATION: 98 %

## 2024-10-24 DIAGNOSIS — G89.29 CHRONIC LOW BACK PAIN, UNSPECIFIED BACK PAIN LATERALITY, UNSPECIFIED WHETHER SCIATICA PRESENT: ICD-10-CM

## 2024-10-24 DIAGNOSIS — Z13.31 POSITIVE DEPRESSION SCREENING: ICD-10-CM

## 2024-10-24 DIAGNOSIS — D64.9 ANEMIA, UNSPECIFIED TYPE: ICD-10-CM

## 2024-10-24 DIAGNOSIS — D50.9 MICROCYTIC ANEMIA: ICD-10-CM

## 2024-10-24 DIAGNOSIS — F41.9 ANXIETY AND DEPRESSION: Primary | ICD-10-CM

## 2024-10-24 DIAGNOSIS — F32.A ANXIETY AND DEPRESSION: Primary | ICD-10-CM

## 2024-10-24 DIAGNOSIS — M54.50 CHRONIC LOW BACK PAIN, UNSPECIFIED BACK PAIN LATERALITY, UNSPECIFIED WHETHER SCIATICA PRESENT: ICD-10-CM

## 2024-10-24 PROBLEM — F33.9 MAJOR DEPRESSION, RECURRENT, CHRONIC: Status: ACTIVE | Noted: 2024-10-24

## 2024-10-24 LAB
GENETIC VARIANT DETAILS BLD/T: NORMAL
GENETICIST REVIEW: NORMAL
HGB A MFR BLD ELPH: 78.2 % (ref 95.8–98)
HGB A2 MFR BLD ELPH: 3.9 % (ref 2–3.3)
HGB F MFR BLD ELPH: 17.9 % (ref 0–0.9)
HGB FRACT BLD ELPH-IMP: ABNORMAL
LAB TEST METHOD: NORMAL
M HPLC HB VARIANT, B: ABNORMAL
MOL DX INTERP BLD/T QL: NORMAL
PROVIDER SIGNING NAME: NORMAL
SPECIMEN SOURCE: NORMAL

## 2024-10-24 PROCEDURE — 3074F SYST BP LT 130 MM HG: CPT | Mod: CPTII,,, | Performed by: NURSE PRACTITIONER

## 2024-10-24 PROCEDURE — 1160F RVW MEDS BY RX/DR IN RCRD: CPT | Mod: CPTII,,, | Performed by: NURSE PRACTITIONER

## 2024-10-24 PROCEDURE — 3044F HG A1C LEVEL LT 7.0%: CPT | Mod: CPTII,,, | Performed by: NURSE PRACTITIONER

## 2024-10-24 PROCEDURE — 3078F DIAST BP <80 MM HG: CPT | Mod: CPTII,,, | Performed by: NURSE PRACTITIONER

## 2024-10-24 PROCEDURE — 82525 ASSAY OF COPPER: CPT

## 2024-10-24 PROCEDURE — 1159F MED LIST DOCD IN RCRD: CPT | Mod: CPTII,,, | Performed by: NURSE PRACTITIONER

## 2024-10-24 PROCEDURE — 99214 OFFICE O/P EST MOD 30 MIN: CPT | Mod: ,,, | Performed by: NURSE PRACTITIONER

## 2024-10-24 PROCEDURE — 36415 COLL VENOUS BLD VENIPUNCTURE: CPT

## 2024-10-24 PROCEDURE — 3008F BODY MASS INDEX DOCD: CPT | Mod: CPTII,,, | Performed by: NURSE PRACTITIONER

## 2024-10-24 RX ORDER — AMOXICILLIN 500 MG/1
500 TABLET, FILM COATED ORAL 2 TIMES DAILY
COMMUNITY

## 2024-10-24 RX ORDER — BUSPIRONE HYDROCHLORIDE 7.5 MG/1
7.5 TABLET ORAL 2 TIMES DAILY
Qty: 60 TABLET | Refills: 0 | Status: SHIPPED | OUTPATIENT
Start: 2024-10-24

## 2024-10-24 RX ORDER — FLUTICASONE PROPIONATE 50 MCG
1 SPRAY, SUSPENSION (ML) NASAL DAILY
COMMUNITY

## 2024-10-24 NOTE — PROGRESS NOTES
"Subjective:      Patient ID: Yue Hickman is a 28 y.o. Black or  female      Chief Complaint: Leave of Absence (work)       Past Medical History:   Diagnosis Date    ADHD (attention deficit hyperactivity disorder)     Anxiety and depression 10/24/2024    Chronic low back pain 10/24/2024    Current moderate episode of major depressive disorder without prior episode 11/01/2023    Esophagitis     GERD (gastroesophageal reflux disease)     Microcytic anemia 10/21/2024    Neurofibromatosis, type 1 06/07/2023        HPI  Presents to clinic for evaluation for a Behavioral Health Assessment for short term disability for work.    Pt works for [employer].  States high stress job, about 10 hours per day x 4 days / weeks.      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  Due to pain, she is unable to stand or lift/push/pull anything heavy.  States her job did not approve "light duty," so she is in need short term disability.    She also has depressed mood and anxiety due to "declining" health and personal stressors (lost of best friend s/t MVA).  Associated symptoms include panic attacks.  Due to this, she is not able to perform her job duties. Took Xanax in the past, she did not like the way it made her feel.  She is requesting referral for counseling, Anxiety medications, and FMLA/STD dated 10/21/2024-November 25th.  Denies any suicidial/homicidal ideations.  Denies any other problems.                      Patient Care Team:  Rosie Alfonso MD as PCP - General (Internal Medicine)      Review of Systems   Constitutional: Negative.  Negative for appetite change, chills and fever.   HENT: Negative.     Eyes: Negative.  Negative for discharge and redness.   Respiratory: Negative.  Negative for shortness of breath.    Cardiovascular: Negative.  Negative for chest pain.   Gastrointestinal: Negative.    Endocrine: Negative.    Genitourinary: Negative.    Musculoskeletal:  Positive for back " pain.   Integumentary:  Negative.   Allergic/Immunologic: Negative.    Neurological: Negative.    Psychiatric/Behavioral:  Positive for decreased concentration, depressed mood, dysphoric mood and sleep disturbance. Negative for behavioral problems, hallucinations, self-injury and suicidal ideas. The patient is nervous/anxious.    All other systems reviewed and are negative.          Objective:      Vitals:    10/24/24 0949   BP: 122/78   Pulse: 90   Resp: 18   Temp: 97.9 °F (36.6 °C)      Body mass index is 27.13 kg/m².     Physical Exam  Vitals reviewed.   Constitutional:       Appearance: Normal appearance.   HENT:      Head: Normocephalic.      Mouth/Throat:      Mouth: Mucous membranes are moist.   Eyes:      Conjunctiva/sclera: Conjunctivae normal.      Pupils: Pupils are equal, round, and reactive to light.   Cardiovascular:      Rate and Rhythm: Normal rate and regular rhythm.   Pulmonary:      Effort: Pulmonary effort is normal. No respiratory distress.      Breath sounds: Normal breath sounds.   Abdominal:      General: Bowel sounds are normal. There is no distension.      Palpations: Abdomen is soft.   Musculoskeletal:         General: Normal range of motion.      Cervical back: Normal range of motion and neck supple.   Skin:     General: Skin is warm and dry.   Neurological:      Mental Status: She is alert and oriented to person, place, and time.   Psychiatric:         Mood and Affect: Mood is anxious. Affect is tearful.            Current Outpatient Medications:     amoxicillin (AMOXIL) 500 MG Tab, Take 500 mg by mouth 2 (two) times a day. For 21 days, Disp: , Rfl:     dextroamphetamine-amphetamine 30 mg Tab, Take 1 tablet (30 mg total) by mouth 2 (two) times a day., Disp: 60 tablet, Rfl: 0    dextroamphetamine-amphetamine 30 mg Tab, Take 1 tablet (30 mg total) by mouth 2 (two) times a day., Disp: 60 tablet, Rfl: 0    dextroamphetamine-amphetamine 30 mg Tab, Take 1 tablet (30 mg total) by mouth 2 (two)  times a day., Disp: 60 tablet, Rfl: 0    etonogestreL-ethinyl estradioL (NUVARING) 0.12-0.015 mg/24 hr vaginal ring, Place vaginally., Disp: , Rfl:     fluticasone propionate (FLONASE) 50 mcg/actuation nasal spray, 1 spray by Each Nostril route once daily., Disp: , Rfl:     gabapentin (NEURONTIN) 100 MG capsule, Take 1 capsule (100 mg total) by mouth every evening., Disp: 30 capsule, Rfl: 2    ondansetron (ZOFRAN-ODT) 4 MG TbDL, Take 4 mg by mouth every 8 (eight) hours as needed., Disp: , Rfl:     busPIRone (BUSPAR) 7.5 MG tablet, Take 1 tablet (7.5 mg total) by mouth 2 (two) times a day., Disp: 60 tablet, Rfl: 0    Assessment & Plan:     Problem List Items Addressed This Visit          Psychiatric    Anxiety and depression - Primary     Worsening symptoms; unable to perform job duties  Referral placed to Behavioral Health for counseling  Rx Buspar  Will complete paperwork for FMLA/STD dated 10/21/2024-11/25/2024   Instructed to continue to monitor symptoms  Report to ED for any suicidal/homicidal ideations, CP, SOB, and/or worsening symptoms  Keep appt with PCP for follow up  Pt is agreeable to plan and verbalizes understanding           Relevant Medications    busPIRone (BUSPAR) 7.5 MG tablet    Other Relevant Orders    Ambulatory referral/consult to Behavioral Health       Orthopedic    Chronic low back pain     Worsening symptoms; unable to perform job duties  Will complete paperwork for FMLA/STD dated 10/21/2024-11/25/2024   Instructed to continue to monitor symptoms  Report to ED for any CP, SOB, and/or worsening symptoms  Keep appt with PCP for follow up  Pt is agreeable to plan and verbalizes understanding          Other Visit Diagnoses       Positive depression screening        I have reviewed the positive depression score which warrants active treatment with psychotherapy and/or medications.               Total time 30 minutes        I have used clinical judgement based on duration and functional status to  consider definite necessity for treatment.

## 2024-10-24 NOTE — ASSESSMENT & PLAN NOTE
Worsening symptoms; unable to perform job duties  Will complete paperwork for FMLA/STD dated 10/21/2024-11/25/2024   Instructed to continue to monitor symptoms  Report to ED for any CP, SOB, and/or worsening symptoms  Keep appt with PCP for follow up  Pt is agreeable to plan and verbalizes understanding

## 2024-10-24 NOTE — ASSESSMENT & PLAN NOTE
Worsening symptoms; unable to perform job duties  Referral placed to Behavioral Health for counseling  Rx Marcin  Will complete paperwork for FMLA/STD dated 10/21/2024-11/25/2024   Instructed to continue to monitor symptoms  Report to ED for any suicidal/homicidal ideations, CP, SOB, and/or worsening symptoms  Keep appt with PCP for follow up  Pt is agreeable to plan and verbalizes understanding

## 2024-10-25 ENCOUNTER — TELEPHONE (OUTPATIENT)
Dept: FAMILY MEDICINE | Facility: CLINIC | Age: 29
End: 2024-10-25
Payer: COMMERCIAL

## 2024-10-25 LAB — COPPER SERPL-MCNC: 171 MCG/DL (ref 77–206)

## 2024-10-25 NOTE — TELEPHONE ENCOUNTER
----- Message from Alysia sent at 10/25/2024 12:35 PM CDT -----  Regarding: Referral Update  Hello,     I reached out to [facility] to check on the status of referral scheduling. Clinic informed me referral was denied by clinic. Reason: 10.24.24 our providers do not offer counseling/therapy services . Referral has been canceled.      Thank you,     Alysia Teixeira  Access Navigator  [facility]   Referral Scheduling Department

## 2024-10-30 ENCOUNTER — TELEPHONE (OUTPATIENT)
Dept: FAMILY MEDICINE | Facility: CLINIC | Age: 29
End: 2024-10-30
Payer: COMMERCIAL

## 2024-11-08 ENCOUNTER — OFFICE VISIT (OUTPATIENT)
Dept: NEUROSURGERY | Facility: CLINIC | Age: 29
End: 2024-11-08
Payer: COMMERCIAL

## 2024-11-08 VITALS
RESPIRATION RATE: 16 BRPM | SYSTOLIC BLOOD PRESSURE: 129 MMHG | DIASTOLIC BLOOD PRESSURE: 87 MMHG | WEIGHT: 134 LBS | HEIGHT: 59 IN | HEART RATE: 89 BPM | BODY MASS INDEX: 27.01 KG/M2

## 2024-11-08 DIAGNOSIS — M54.9 DORSALGIA, UNSPECIFIED: ICD-10-CM

## 2024-11-08 DIAGNOSIS — Q85.01 NEUROFIBROMATOSIS, TYPE 1: Primary | ICD-10-CM

## 2024-11-08 PROCEDURE — 3008F BODY MASS INDEX DOCD: CPT | Mod: CPTII,,, | Performed by: PHYSICIAN ASSISTANT

## 2024-11-08 PROCEDURE — 3074F SYST BP LT 130 MM HG: CPT | Mod: CPTII,,, | Performed by: PHYSICIAN ASSISTANT

## 2024-11-08 PROCEDURE — 3079F DIAST BP 80-89 MM HG: CPT | Mod: CPTII,,, | Performed by: PHYSICIAN ASSISTANT

## 2024-11-08 PROCEDURE — 3044F HG A1C LEVEL LT 7.0%: CPT | Mod: CPTII,,, | Performed by: PHYSICIAN ASSISTANT

## 2024-11-08 PROCEDURE — 99203 OFFICE O/P NEW LOW 30 MIN: CPT | Mod: ,,, | Performed by: PHYSICIAN ASSISTANT

## 2024-11-08 NOTE — PROGRESS NOTES
[facility]  History & Physical  Neurosurgery      Yue Hickman   [id number]   1995     SUBJECTIVE:     CHIEF COMPLAINT:  No chief complaint on file.      HPI:  Yue Hickman is a 28 y.o. female who presents for neurosurgical evaluation.  The patient has been referred to Dr. Ward by Dr. Rosie Alfonso.  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  She has not been followed since she was a child.  In 2017, she required excision of a neurofibroma at the right inner upper arm.  She did not have difficulties after that time.    Three months ago, she traveled to [place].  After the extended time in the car, she began to experience increased lower back pain.  She complains of pain in the lower thoracic as well as through the lumbar region.  There is radiating pain into the right buttock and proximal/posterior thigh.  Her back pain increases with lifting, prolonged sitting, and sometimes with prolonged standing.  She was had 6 months of intermittent aching in the lower back.    Physical therapy has helped at times to diminish her pain.  However, her pain increases with activity.  She was currently in physical therapy and obtains massages.  Her symptoms are mostly at a plateau, waxing and waning in severity.  She was using Tylenol and ibuprofen on a regular basis.  Subjectively, she feels as though she has weakness in her legs.  She notes it was tough to lift a 20 lb box.  She rates her pain today at 3/10.  She has not been working due to her pain.  She was to return to work the week of Thanksgiving.  The patient denies disturbances in bowel or bladder function.  There is no difficulty with balance.     In addition, she reports to have intermittent neck pain with decreased range of motion.  There is no numbness, tingling, or pain in either upper extremity.  She denies weakness in her upper extremities.  She does have headaches at times at the back of her head and at her eyes.      Past Medical  History:   Diagnosis Date    ADHD (attention deficit hyperactivity disorder)     Anemia, unspecified type     Anxiety and depression 10/24/2024    Cervicalgia     Chronic low back pain 10/24/2024    Current moderate episode of major depressive disorder without prior episode 11/01/2023    Esophagitis     GERD (gastroesophageal reflux disease)     Low back pain, unspecified     Microcytic anemia 10/21/2024    Muscle weakness (generalized)     Neurofibromatosis, type 1 06/07/2023    Other abnormalities of gait and mobility     Pain in thoracic spine     Pain in unspecified hip     Radiculopathy, lumbar region     Sacroiliitis, not elsewhere classified        Past Surgical History:   Procedure Laterality Date    NEUROFIBROMA EXCISION Right 2017    inner upper arm    SINUS SURGERY      TONSILLECTOMY         Family History   Problem Relation Name Age of Onset    Diabetes Mother      Hypertension Mother      Cancer Father         Social History     Socioeconomic History    Marital status: Single    Number of children: 0   Tobacco Use    Smoking status: Former     Types: Vaping with nicotine    Smokeless tobacco: Never   Substance and Sexual Activity    Alcohol use: Not Currently    Drug use: Not Currently     Social Drivers of Health     Financial Resource Strain: Patient Declined (3/20/2024)    Overall Financial Resource Strain (CARDIA)     Difficulty of Paying Living Expenses: Patient declined   Food Insecurity: Unknown (3/20/2024)    Hunger Vital Sign     Ran Out of Food in the Last Year: Patient declined   Transportation Needs: Patient Declined (3/20/2024)    PRAPARE - Transportation     Lack of Transportation (Medical): Patient declined     Lack of Transportation (Non-Medical): Patient declined   Physical Activity: Unknown (3/20/2024)    Exercise Vital Sign     Days of Exercise per Week: Patient declined   Stress: No Stress Concern Present (6/7/2023)    [employer] - Occupational Stress  "Questionnaire     Feeling of Stress : Only a little   Housing Stability: Patient Declined (3/20/2024)    Housing Stability Vital Sign     Unable to Pay for Housing in the Last Year: Patient declined     Unstable Housing in the Last Year: Patient declined       Current Outpatient Medications   Medication Instructions    busPIRone (BUSPAR) 7.5 mg, Oral, 2 times daily    dextroamphetamine-amphetamine 30 mg Tab 30 mg, Oral, 2 times daily    etonogestreL-ethinyl estradioL (NUVARING) 0.12-0.015 mg/24 hr vaginal ring Place vaginally.    fluticasone propionate (FLONASE) 50 mcg/actuation nasal spray 1 spray, Daily    gabapentin (NEURONTIN) 100 mg, Oral, Nightly    ondansetron (ZOFRAN-ODT) 4 mg, Every 8 hours PRN       Review of patient's allergies indicates:  No Known Allergies       Review of Systems   Constitutional:  Negative for chills and fever.   HENT:  Negative for nosebleeds and sore throat.    Eyes:  Negative for pain and visual disturbance.   Respiratory:  Negative for cough, chest tightness and shortness of breath.    Cardiovascular:  Negative for chest pain.   Gastrointestinal:  Positive for diarrhea. Negative for nausea and vomiting.   Genitourinary:  Negative for difficulty urinating, dysuria and hematuria.   Musculoskeletal:  Positive for back pain, neck pain and neck stiffness. Negative for gait problem and myalgias.   Skin:  Negative for rash.   Neurological:  Positive for dizziness, weakness and headaches. Negative for facial asymmetry and numbness.   Psychiatric/Behavioral:  Negative for confusion and sleep disturbance. The patient is not nervous/anxious.        OBJECTIVE:       Visit Vitals  /87 (BP Location: Right arm, Patient Position: Sitting)   Pulse 89   Resp 16   Ht 4' 11" (1.499 m)   Wt 60.8 kg (134 lb)   LMP 10/10/2024   BMI 27.06 kg/m²        General:  Pleasant, Well-nourished, Well-groomed.    CV:  Neck is supple.  There are no carotid bruits.  Heart has regular rate and " rhythm.    Lungs:  Lungs are clear to auscultation bilaterally with non-labored respirations.    Abdomen:  Soft, non-tender, non-distended    Musculoskeletal:   Cervical ROM:  Full.  Tinel's sign is negative at bilateral wrists and elbows.  Spurling's maneuver is negative bilaterally.  Straight leg raise is negative bilaterally.  Crossed straight leg raise is negative bilaterally.  Hip rotation is negative bilaterally.    Neurological:  The patient is awake, alert, and oriented in all 4 spheres.  Muscle strength against resistance:  Strength  Deltoids Biceps Triceps Wrist Extension Wrist Flexion Intrinsics   Upper: R 5/5 5/5 5/5 5/5  5/5    L 5/5 5/5 5/5 5/5  5/5     Iliopsoas Quadriceps Knee  Flexion Tibialis  anterior Gastro- cnemius EHL   Lower: R 5/5 5/5 5/5 5/5 5/5 5-/5    L 5/5 5/5 5/5 5/5 5/5 5/5   Sensation is intact to primary modalities in bilateral lower extremities.  Galvin's sign is negative bilaterally.  Toes are downgoing to Babinski.  There is unsustained clonus testing Achilles tendon reflex.  Reflexes:   Right Left   Triceps 2+ 3+   Biceps 2+ 3+   Brachioradialis 2+ 3+   Patellar 2+ 3+   Achilles 4+ 4+   Gait is normal.  She was able to walk in tandem relatively well.  She was able to walk on her toes.  She was a little difficulty walking on her heels.      Imaging:  All pertinent neuroimaging independently reviewed. Discussed these findings in detail with the patient.      ASSESSMENT:     1. Neurofibromatosis, type 1  Ambulatory referral/consult to Neurosurgery    MRI Brain W WO Contrast    MRI Cervical Spine W WO Cont    MRI lumbar spine with contrast      2. Dorsalgia, unspecified  Ambulatory referral/consult to Neurosurgery    MRI lumbar spine with contrast    X-Ray Lumbar Complete Including Flex And Ext        PLAN:     Options were discussed at length with the patient.  We need to obtain MRI of the brain, cervical and lumbar spine both with and without contrast to assess for possible  additional neurofibromas.  We will also obtain lumbar x-rays with flexion-extension views.  She will return to see Dr. Ward with that imaging.      A total of 40 minutes was spent face-to-face with the patient during this encounter.  Over half of that time was spent on counseling and coordination of care.  Additional time was used to reviewed the patient's chart including notes from Jemma Noble and Dr. Alfonso, brain, lumbar and thoracic MRIs images and reports, and work on office note.      Maria E Hamm PA-C      Disclaimer:  This note is prepared using voice recognition software and as such is likely to have errors despite attempts at proofreading.

## 2024-11-13 ENCOUNTER — OFFICE VISIT (OUTPATIENT)
Dept: HEMATOLOGY/ONCOLOGY | Facility: CLINIC | Age: 29
End: 2024-11-13
Payer: COMMERCIAL

## 2024-11-13 DIAGNOSIS — D56.8: Primary | ICD-10-CM

## 2024-11-13 DIAGNOSIS — D50.9 MICROCYTIC ANEMIA: ICD-10-CM

## 2024-11-13 DIAGNOSIS — E83.110 HEMOCHROMATOSIS, HEREDITARY: ICD-10-CM

## 2024-11-13 NOTE — PROGRESS NOTES
HEMATOLOGY/ONCOLOGY OFFICE CLINIC VISIT    Visit Information:    Initial Evaluation: 10/21/2024  Referring Provider:   Other providers:  Code status:Not addressed    Diagnosis:  Beta zero Thalassemia  Hereditary hemochromatosis-heterogeneous      CLINICAL HISTORY:       Patient: Yue Hickman is a 28 y.o. female with H/o neurofribromatosis, type 1 kindly referred for Microcytic anemia and Elevated ferritin.     Patient is doing relatively well and besides fatigue and tiredness she is doing well. She is not taking iron. No family h/o blood disorders. No recent illness or hospitalizations.  She stated that she took the DNA- test from  and me that showed that she has hemochromatosis. She is not sure how reliable this test is.     Chief Complaint: 2 Week Follow Up (Virtual Visit.)      Interval History:  Patient is evaluated via telemedicine to discuss results.  She is when okay and her fatigue even though persists, has improved significantly      Past Medical History:   Diagnosis Date    ADHD (attention deficit hyperactivity disorder)     Anxiety and depression 10/24/2024    Chronic low back pain 10/24/2024    Current moderate episode of major depressive disorder without prior episode 11/01/2023    Esophagitis     GERD (gastroesophageal reflux disease)     Microcytic anemia 10/21/2024    Neurofibromatosis, type 1 06/07/2023      Past Surgical History:   Procedure Laterality Date    NEUROFIBROMA EXCISION Right 2017    inner upper arm    SINUS SURGERY      TONSILLECTOMY       Family History   Problem Relation Name Age of Onset    Diabetes Mother      Hypertension Mother      Cancer Father            Review of patient's allergies indicates:  No Known Allergies   Current Outpatient Medications on File Prior to Visit   Medication Sig Dispense Refill    busPIRone (BUSPAR) 7.5 MG tablet Take 1 tablet (7.5 mg total) by mouth 2 (two) times a day. (Patient taking differently: Take 7.5 mg by mouth Daily.) 60 tablet 0     dextroamphetamine-amphetamine 30 mg Tab Take 1 tablet (30 mg total) by mouth 2 (two) times a day. 60 tablet 0    etonogestreL-ethinyl estradioL (NUVARING) 0.12-0.015 mg/24 hr vaginal ring Place vaginally.      fluticasone propionate (FLONASE) 50 mcg/actuation nasal spray 1 spray by Each Nostril route once daily.      gabapentin (NEURONTIN) 100 MG capsule Take 1 capsule (100 mg total) by mouth every evening. 30 capsule 2    ondansetron (ZOFRAN-ODT) 4 MG TbDL Take 4 mg by mouth every 8 (eight) hours as needed.       No current facility-administered medications on file prior to visit.      Review of Systems   Constitutional:  Negative for activity change, appetite change, chills, fatigue, fever, night sweats and unexpected weight change.   HENT:  Negative for mouth dryness, mouth sores, nosebleeds, sore throat and trouble swallowing.    Eyes: Negative.  Negative for visual disturbance.   Respiratory:  Negative for cough and shortness of breath.    Cardiovascular:  Negative for chest pain, palpitations and leg swelling.   Gastrointestinal:  Negative for abdominal distention, abdominal pain, blood in stool, change in bowel habit, constipation, diarrhea, nausea and vomiting.   Endocrine: Negative.    Genitourinary:  Negative for dysuria, frequency, hematuria and urgency.   Musculoskeletal:  Positive for back pain. Negative for arthralgias, myalgias and neck pain.   Integumentary:  Negative for rash.   Neurological:  Negative for dizziness, tremors, syncope, speech difficulty, weakness, light-headedness, numbness, headaches and memory loss.   Hematological:  Negative for adenopathy. Does not bruise/bleed easily.   Psychiatric/Behavioral:  Negative for confusion and suicidal ideas. The patient is nervous/anxious.               There were no vitals filed for this visit.     Wt Readings from Last 6 Encounters:   11/08/24 60.8 kg (134 lb)   10/24/24 60.9 kg (134 lb 4.8 oz)   10/21/24 60.6 kg (133 lb 8 oz)   10/02/24 61.2 kg  (135 lb)   07/03/24 60.9 kg (134 lb 4.8 oz)   03/20/24 61 kg (134 lb 6.4 oz)     There is no height or weight on file to calculate BMI.  There is no height or weight on file to calculate BSA.  Physical Exam  Vitals reviewed: LIMITED DUE TO TELEMEDICINE RESTRICTIONS..   Constitutional:       Appearance: Normal appearance.   HENT:      Head: Normocephalic.   Eyes:      Extraocular Movements: Extraocular movements intact.   Pulmonary:      Effort: Pulmonary effort is normal.   Musculoskeletal:      Cervical back: Neck supple.   Neurological:      Mental Status: She is alert.   Psychiatric:         Mood and Affect: Mood normal.         Behavior: Behavior normal.         Thought Content: Thought content normal.         Judgment: Judgment normal.       Laboratory:  CBC with Differential:  Lab Results   Component Value Date    WBC 5.42 10/21/2024    RBC 4.85 10/21/2024    HGB 11.5 (L) 10/21/2024    HCT 35.2 (L) 10/21/2024    MCV 72.6 (L) 10/21/2024    MCH 23.7 (L) 10/21/2024    MCHC 32.7 (L) 10/21/2024    RDW 19.0 (H) 10/21/2024     10/21/2024    MPV 9.8 10/21/2024        CMP:  Sodium   Date Value Ref Range Status   10/21/2024 141 136 - 145 mmol/L Final     Potassium   Date Value Ref Range Status   10/21/2024 5.0 3.5 - 5.1 mmol/L Final     Chloride   Date Value Ref Range Status   10/21/2024 110 (H) 98 - 107 mmol/L Final     CO2   Date Value Ref Range Status   10/21/2024 25 22 - 29 mmol/L Final     Blood Urea Nitrogen   Date Value Ref Range Status   10/21/2024 12.8 7.0 - 18.7 mg/dL Final     Creatinine   Date Value Ref Range Status   10/21/2024 0.69 0.55 - 1.02 mg/dL Final     Calcium   Date Value Ref Range Status   10/21/2024 9.3 8.4 - 10.2 mg/dL Final     Albumin   Date Value Ref Range Status   10/21/2024 3.9 3.5 - 5.0 g/dL Final   10/21/2024 3.5 3.5 - 5.0 g/dL Final     Bilirubin Total   Date Value Ref Range Status   10/21/2024 0.9 <=1.5 mg/dL Final     ALP   Date Value Ref Range Status   10/21/2024 41 40 - 150  unit/L Final     AST   Date Value Ref Range Status   10/21/2024 12 5 - 34 unit/L Final     ALT   Date Value Ref Range Status   10/21/2024 9 0 - 55 unit/L Final     Estimated GFR-Non    Date Value Ref Range Status   03/30/2022 >60        Latest Reference Range & Units 10/16/24 08:01 10/21/24 13:45   Iron 50 - 170 ug/dL 316 (H) 181 (H)   TIBC 250 - 450 ug/dL See Comments 284   UIBC 70 - 310 ug/dL <25 (L) 103   Transferrin 180 - 382 mg/dL 226 245   Ferritin 4.63 - 204.00 ng/mL 68.06 67.94   Folate 7.0 - 31.4 ng/mL 12.3 12.9   Vitamin B12 213 - 816 pg/mL 869 (H) 677   Iron Saturation 20 - 50 % See Comments 64 (H)   (H): Data is abnormally high  (L): Data is abnormally low      Hb electrophoresis:   Beta Globin Sequencing: Positive      The following alterations were detected:   Gene: HBB   Legacy: Beta IVS-II-849, A>G   HGVS: c.316-2A>G   Genomic: [id number]   Heterozygous   Classification: Beta Zero Thalassemia mutation      Hemochromatosis:  C282Y: Not detected.   H63D: One copy of the H63D variant was identified.   SUMMARY INTERPRETATION:   These results confirm a heterozygous beta thalassemia alteration. In heterozygous individuals, this beta zero thalassemia mutation is typically associated with mild to   moderate anemia (Hgb 10-12 g/dL), marked hypochromia (18.4-19.8 fL), variably elevated Hb A2, and elevated Hb F in some individuals that persists into adulthood   ([place] 1990 PMID [id number]). Clinical correlation is necessary.     This individual is heterozygous for H63D. This result is consistent with at minimum a carrier status for hereditary hemochromatosis (HH).   The presence of a single H63D variant is unlikely to be of clinical significance in the absence of other disease-causing variants.            Assessment:       1. Delta beta zero thalassemia    2. Microcytic anemia    3. Hemochromatosis, hereditary      1) Beta zero Thalassemia  Explained to the patient that Beta-zero thalassemia  is a disorder of the hemoglobin and a severe form of beta thalassemia, is an autosomal recessive disorder, meaning that a person must inherit two mutated copies of the HBB gene (one from each parent) to develop the condition.    Individuals with beta-zero thalassemia can have a good quality of life,however, the condition can be life-threatening without proper management    2) Hereditary Hemochromatosis  Explained to her that Hemochromatosis is a hereditary disorder that causes the body to absorb too much iron resulting in a buildup of iron in the body   Event though them mutation that the patient has for hemochromatosis is H 63 D that as mentioned above consists with a minimum carrier status for hereditary hemochromatosis and probably not clinical significance.    In this case, besides hereditary hemochromatosis patient also with beta zero thalassemia and this combination can both result in the inappropriately low production of the hormone hepcidin, leading to an increase in intestinal absorption and excessive iron deposition in the parenchymal cells.     Patient iron decreased from 316-181; she just recently finished her menstrual cycle and reports that she is feeling better with more energy now that her iron levels are lower then back in early October.          Plan:       I will continue to follow.   RTC 4 months with labs prior  Visit can be telemedicine  Labs: CBC, CMP, iron profile and ferritin  Instructed to decrease iron content in her diet  We will hold phlebotomy for now but may need in the future.    The patient was seen, interviewed and examined. Pertinent lab and radiology studies were reviewed.   The patient was given ample opportunity to ask questions, and to the best of my abilities, all questions answered to satisfaction; patient demonstrated understanding of what we discussed and agreeable to the plan. Pt instructed to call should develop concerning signs/symptoms or have further questions.        Alda Drummond MD  Hematology/Oncology

## 2024-11-20 ENCOUNTER — TELEPHONE (OUTPATIENT)
Dept: NEUROSURGERY | Facility: CLINIC | Age: 29
End: 2024-11-20
Payer: COMMERCIAL

## 2024-11-20 NOTE — TELEPHONE ENCOUNTER
Dr. Ward does have an opening on 11/26/24 at 4:00 that I wanted to offer the patient as she was on the wait list. She stated that day would be fine. Her appointment is now rescheduled.

## 2024-11-22 DIAGNOSIS — D64.9 ANEMIA, UNSPECIFIED TYPE: Primary | ICD-10-CM

## 2024-11-26 ENCOUNTER — OFFICE VISIT (OUTPATIENT)
Dept: NEUROSURGERY | Facility: CLINIC | Age: 29
End: 2024-11-26
Payer: COMMERCIAL

## 2024-11-26 VITALS
WEIGHT: 137 LBS | HEART RATE: 87 BPM | HEIGHT: 59 IN | BODY MASS INDEX: 27.62 KG/M2 | SYSTOLIC BLOOD PRESSURE: 124 MMHG | RESPIRATION RATE: 16 BRPM | DIASTOLIC BLOOD PRESSURE: 82 MMHG

## 2024-11-26 DIAGNOSIS — D36.14 NEUROFIBROMA OF THORACIC REGION: Primary | ICD-10-CM

## 2024-11-26 DIAGNOSIS — G89.29 CHRONIC BILATERAL LOW BACK PAIN WITH BILATERAL SCIATICA: ICD-10-CM

## 2024-11-26 DIAGNOSIS — Q85.01 NEUROFIBROMATOSIS, TYPE 1: ICD-10-CM

## 2024-11-26 DIAGNOSIS — M54.41 CHRONIC BILATERAL LOW BACK PAIN WITH BILATERAL SCIATICA: ICD-10-CM

## 2024-11-26 DIAGNOSIS — M54.42 CHRONIC BILATERAL LOW BACK PAIN WITH BILATERAL SCIATICA: ICD-10-CM

## 2024-11-26 RX ORDER — IBUPROFEN 200 MG
200 TABLET ORAL EVERY 6 HOURS PRN
COMMUNITY

## 2024-11-26 RX ORDER — ACETAMINOPHEN 325 MG/1
325 TABLET ORAL EVERY 6 HOURS PRN
COMMUNITY

## 2024-11-26 NOTE — PATIENT INSTRUCTIONS
Ms. Hickman is a 28 y.o. female who has a past medical history significant for neurofibromatosis Type I, Thalassemia, hemochromatosis, GERD, anemia, ADHD, anxiety, and depression.  She is s/p a resection of a neurofibroma from her right upper arm by a plastic surgeon in 2017.  Ms. Hickman presents as a follow up patient in the neurosurgery clinic for chronic lower back pain that started 4 months ago in August 2024 after traveling to [place], [place].  The patient has a normal motor exam with hypersensitivity in her right toes.  There are no signs of myelopathy.    I reviewed pertinent imaging studies with the patient.  A MRI thoracic spine with and without gadolinium demonstrates normal alignment.  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  The right T12-L1 neuroforamen is enlarged with no significant neural compression.        PLAN:    Encounter Diagnoses   Name Primary?    Neurofibroma of thoracic region Yes    Neurofibromatosis, type 1     Chronic bilateral low back pain with bilateral sciatica      Orders Placed This Encounter   Procedures    MRI Thoracic Spine W WO Contrast        1.  I discussed with Ms. Hickman that her right T12-L1 nerve sheath tumor will continue to be monitored with serial imaging studies.  I am ordering a MRI thoracic spine with and without gadolinium, to be completed in 6 months, which corresponds to late May 2025.  Should this mass increase in size and become more symptomatic with time, she may be a future candidate for surgical resection of this mass.  This specialized surgery is beyond my scope of practice, so my plan is to transfer her care to Dr. Mckenzie, who is fellowship trained in neurosurgical oncology.      2.  The patient is recommended to continue with her PT sessions at [facility].    3.  She is scheduled to follow up with her oncologist Dr. Drummond on 03/19/2025.      4.   Ms. Hickman is [place] [facility] [place].     5.  The patient is recommended to continue her scheduled weekly appointments with a counselor for mental health.    6.  Arrangements are being made for the patient to follow up in the neurosurgery clinic with Dr. Mckenzie in late May 2025, approximately 2 days after her MRI thoracic spine with and without gadolinium study has been completed.      This note will be sent to the patient's referring provider No ref. provider found and primary care provider Rosie Alfonso MD.

## 2024-11-26 NOTE — PROGRESS NOTES
[facility]   Neurosurgery      Yue Hickman  MRN: [id number], CSN: [id number]      : 1995   Age: 28 y.o. female  Payor: [facility] BLUE SHIELD / Plan: BCBS ALL OUT OF STATE / Product Type: PPO /       Ref:  No referring provider defined for this encounter.    PCP: Rosie Alfonso MD    Visit Date: 2024     Patient Active Problem List   Diagnosis    Class 1 obesity due to excess calories without serious comorbidity with body mass index (BMI) of 31.0 to 31.9 in adult    ADHD (attention deficit hyperactivity disorder)    GERD (gastroesophageal reflux disease)    Neurofibromatosis, type 1    Current moderate episode of major depressive disorder without prior episode    Microcytic anemia    Major depression, recurrent, chronic    Anxiety and depression    Chronic low back pain    Delta beta zero thalassemia    Hemochromatosis, hereditary       SUBJECTIVE:      CC:   Chief Complaint   Patient presents with    chronic low back pain, hx of NF1       HPI:   Ms. Hickman is a 28 y.o. right handed female who has a past medical history significant for neurofibromatosis Type I, Thalassemia, hemochromatosis, GERD, anemia, ADHD, anxiety, and depression.  She is s/p a resection of a neurofibroma from her right upper arm by a plastic surgeon in 2017.  Ms. Hickman presents as a follow up patient in the neurosurgery clinic for chronic lower back pain that started 4 months ago in 2024 after traveling to [place], [place].    The patient's last date of visit in the neurosurgery clinic was on 2024 with JEREMÍAS Sanderson.  The plan of care was to complete MRI imaging studies of her entire neuro axis with and without gadolinium.  In addition, lumbar spine x-rays were ordered and completed.  Ms. Hickman's radiographic imaging studies will be reviewed during today's appointment.      Ms. Hickman reports that she was diagnosed with NF1 as a baby.  She has been experiencing intermittent pain in her mid  back, lower back, and bilateral hips for several years.  In fact, the patient has been going to a chiropractor for the past year for her bilateral hip pain.  Approximately 4 months ago, she traveled to [place], [place].  After sitting a long time in the car, Ms. Hickman developed pain in her lower back radiating to her bilateral hips and right leg with occasional pain down her left leg.  She describes intermittent numbness in her bilateral legs without any focal weakness or bowel/ bladder incontinence.  The patient has been fully ambulatory.      She rates her aching back pain as a constant 6/10.  There is increased discomfort with prolonged sitting and walking.  There is a reduction of pain when taking ibuprofen.  She has also tried taking Tylenol and Neurontin at night.  Ms. Hickman is currently participating in a PT program at [facility].  She has improved with dry needling treatments and use of a TENS unit.  The patient is not established with a pain management specialist.      Ms. Hickman visits with a mental health counselor on a weekly basis.  Her established oncologist is Dr. [facility].  In addition, the patient has an upcoming appointment to establish care with a  in [place].       Patient Active Problem List    Diagnosis Date Noted    Delta beta zero thalassemia 11/13/2024    Hemochromatosis, hereditary 11/13/2024    Major depression, recurrent, chronic 10/24/2024    Anxiety and depression 10/24/2024    Chronic low back pain 10/24/2024    Microcytic anemia 10/21/2024    Current moderate episode of major depressive disorder without prior episode 11/01/2023    Neurofibromatosis, type 1 06/07/2023    GERD (gastroesophageal reflux disease)     Class 1 obesity due to excess calories without serious comorbidity with body mass index (BMI) of 31.0 to 31.9 in adult 05/25/2022    ADHD (attention deficit hyperactivity disorder)      Past Medical History:   Diagnosis Date    ADHD (attention  deficit hyperactivity disorder)     Anemia, unspecified type     Anxiety and depression 10/24/2024    Cervicalgia     Chronic low back pain 10/24/2024    Current moderate episode of major depressive disorder without prior episode 11/01/2023    Esophagitis     GERD (gastroesophageal reflux disease)     Low back pain, unspecified     Microcytic anemia 10/21/2024    Muscle weakness (generalized)     Neurofibromatosis, type 1 06/07/2023    Other abnormalities of gait and mobility     Pain in thoracic spine     Pain in unspecified hip     Radiculopathy, lumbar region     Sacroiliitis, not elsewhere classified      Past Surgical History:   Procedure Laterality Date    NEUROFIBROMA EXCISION Right 2017    inner upper arm    SINUS SURGERY      TONSILLECTOMY         Current Outpatient Medications:     acetaminophen (TYLENOL) 325 MG tablet, Take 325 mg by mouth every 6 (six) hours as needed for Pain., Disp: , Rfl:     busPIRone (BUSPAR) 7.5 MG tablet, Take 1 tablet (7.5 mg total) by mouth 2 (two) times a day., Disp: 60 tablet, Rfl: 0    dextroamphetamine-amphetamine 30 mg Tab, Take 1 tablet (30 mg total) by mouth 2 (two) times a day., Disp: 60 tablet, Rfl: 0    etonogestreL-ethinyl estradioL (NUVARING) 0.12-0.015 mg/24 hr vaginal ring, Place vaginally., Disp: , Rfl:     fluticasone propionate (FLONASE) 50 mcg/actuation nasal spray, 1 spray by Each Nostril route once daily., Disp: , Rfl:     gabapentin (NEURONTIN) 100 MG capsule, Take 1 capsule (100 mg total) by mouth every evening., Disp: 30 capsule, Rfl: 2    ibuprofen (ADVIL,MOTRIN) 200 MG tablet, Take 200 mg by mouth every 6 (six) hours as needed for Pain., Disp: , Rfl:     ondansetron (ZOFRAN-ODT) 4 MG TbDL, Take 4 mg by mouth every 8 (eight) hours as needed., Disp: , Rfl:     Review of patient's allergies indicates:  No Known Allergies    Social History     Tobacco Use    Smoking status: Former     Types: Vaping with nicotine    Smokeless tobacco: Never   Substance Use  "Topics    Alcohol use: Not Currently     Occupation: works in the [employer]    Family History   Problem Relation Name Age of Onset    Diabetes Mother      Hypertension Mother      Cancer Father         ROS:  Constitutional:  Negative for chills and fever.   HENT:  Negative for congestion and sore throat.    Eyes:  Negative for blurred vision and double vision.   Respiratory:  Negative for cough and shortness of breath.    Cardiovascular:  Negative for chest pain and palpitations.   Gastrointestinal:  Positive for nausea, Negative for constipation, diarrhea, and vomiting.   Musculoskeletal:  Positive for back pain, Negative for neck pain.   Neurological:  Positive for sensory change, Negative for focal weakness and headaches.   Endo/Heme/Allergies:  Does not bruise/bleed easily.   Psychiatric/Behavioral:  Positive for depression and anxiety.      OBJECTIVE:     EXAMINATION:  /82 (BP Location: Right arm, Patient Position: Sitting)   Pulse 87   Resp 16   Ht 4' 11" (1.499 m)   Wt 62.1 kg (137 lb)   BMI 27.67 kg/m²   Body Habitus: Normal    Physical Exam:  Constitutional: The patient is well-developed and cooperative, sitting comfortably in a chair    Mental Status:   Oriented to person, place, and time  Normal speech    Cranial nerves:  CN II: PERRL, 4 to 3 mm, brisk bilaterally  CN III, IV, and VI: extraocular movements normal, no ptosis  CN V: normal facial sensation and masseter function  CN VII: facial strength normal and symmetrical  CN VIII: hearing normal bilaterally  CN IX and X: swallowing and phonation normal  CN XI: shoulder shrug intact bilaterally  CN XII: tongue protrusion midline    Motor:  Muscle bulk: normal in all extremities  Tone: normal in all extremities  No pronator drift    Upper extremities:  Deltoid: right 5/5; left 5/5  Biceps: right 5/5; left 5/5  Triceps: right 5/5; left 5/5  Wrist extensors: right 5/5; left 5/5  Wrist flexors: right 5/5; left 5/5  : right 5/5; " left 5/5  Interosseous muscles: right 5/5; left 5/5    Lower extremities:  Hip flexors: right 5/5; left 5/5  Knee extensors: right 5/5; left 5/5  Knee flexors: right 5/5; left 5/5  Foot dorsiflexors: right 5/5; left 5/5  Foot plantar flexors: right 5/5; left 5/5  Extensor hallucis longus: right 5/5; left 5/5    Sensation:  Normal to light touch x 4 extremities with hyperesthesias in right toes  Normal sensation to light touch in thorax and abdomen    Reflexes:  Galvins sign: right negative; left negative  Babinski: right downgoing; left downgoing  Clonus: right negative; left negative    Coordination:  Finger to nose: right normal; left normal    Musculoskeletal:    Gait:  normal    Straight leg test: right positive; left negative    Cervical: No pain with palpation  Upper back: No pain with palpation  Lower back: No pain with palpation      DIAGNOSTICS REVIEW OF IMAGING, LAB & OTHER STUDIES:  I have personally reviewed and evaluated the following reports as well as radiographic studies that were completed on 11/21/2024:    Lumbar spine x-rays- normal alignment with no motion on flexion-extension views.        MRI brain with and without gadolinium- normal alignment with no abnormal enhancement.    MRI cervical spine with and without gadolinium- normal alignment with no significant neural compression and no abnormal enhancement.    MRI thoracic spine with and without gadolinium, 10/29/2024- there is normal alignment.  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  The right T12-L1 neuroforamen is enlarged with no significant neural compression.    MRI lumbar spine without gadolinium, 10/17/2024- there is normal alignment.  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      ASSESSMENT:  Ms. Hickman is a 28 y.o. female who has a past medical history significant for  neurofibromatosis Type I, Thalassemia, hemochromatosis, GERD, anemia, ADHD, anxiety, and depression.  She is s/p a resection of a neurofibroma from her right upper arm by a plastic surgeon in 2017.  Ms. Hickman presents as a follow up patient in the neurosurgery clinic for chronic lower back pain that started 4 months ago in August 2024 after traveling to [place], [place].  The patient has a normal motor exam with hypersensitivity in her right toes.  There are no signs of myelopathy.    I reviewed pertinent imaging studies with the patient.  A MRI thoracic spine with and without gadolinium demonstrates normal alignment.  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  The right T12-L1 neuroforamen is enlarged with no significant neural compression.        PLAN:    Encounter Diagnoses   Name Primary?    Neurofibroma of thoracic region Yes    Neurofibromatosis, type 1     Chronic bilateral low back pain with bilateral sciatica      Orders Placed This Encounter   Procedures    MRI Thoracic Spine W WO Contrast        1.  I discussed with Ms. Hickman that her right T12-L1 nerve sheath tumor will continue to be monitored with serial imaging studies.  I am ordering a MRI thoracic spine with and without gadolinium, to be completed in 6 months, which corresponds to late May 2025.  Should this mass increase in size and become more symptomatic with time, she may be a future candidate for surgical resection of this mass.  This specialized surgery is beyond my scope of practice, so my plan is to transfer her care to Dr. Mckenzie, who is fellowship trained in neurosurgical oncology.      2.  The patient is recommended to continue with her PT sessions at [facility].    3.  She is scheduled to follow up with her oncologist Dr. [facility] on 03/19/2025.      4.  Ms. Hickman is scheduled to be evaluated by  Dr. Chloe Powers at  [facility] in [place] on 05/15/2025.     5.  The patient is recommended to continue her scheduled weekly appointments with a counselor for mental health.    6.  Arrangements are being made for the patient to follow up in the neurosurgery clinic with Dr. Mckenzie in late May 2025, approximately 2 days after her MRI thoracic spine with and without gadolinium study has been completed.      This note will be sent to the patient's referring provider No ref. provider found and primary care provider Rosie Alfonso MD.           Alyson Ward MD  Neurosurgeon

## 2024-11-27 ENCOUNTER — TELEPHONE (OUTPATIENT)
Dept: FAMILY MEDICINE | Facility: CLINIC | Age: 29
End: 2024-11-27
Payer: COMMERCIAL

## 2024-11-27 NOTE — TELEPHONE ENCOUNTER
Spoke with pt  Requesting rx ibuprofen meds due to starting PT and neurosx deferring surgery  Does not want to take too much OTC ibuprofen  Please advise if appropriate  Thanks

## 2024-11-27 NOTE — TELEPHONE ENCOUNTER
----- Message from Bakari sent at 11/27/2024 12:33 PM CST -----  Who Called: Yue Hickman    Caller is requesting assistance/information from provider's office.    Symptoms (please be specific):    How long has patient had these symptoms:    List of preferred pharmacies on file (remove unneeded): [unfilled]  If different, enter pharmacy into here including location and phone number: [facility] [place] [address]      Patient's Preferred Phone Number on File: [phone]   Best Call Back Number, if different:  Additional Information: req ibuprofen to help with back pain , please follow up

## 2024-12-02 ENCOUNTER — PATIENT MESSAGE (OUTPATIENT)
Dept: FAMILY MEDICINE | Facility: CLINIC | Age: 29
End: 2024-12-02
Payer: COMMERCIAL

## 2024-12-18 DIAGNOSIS — F32.A ANXIETY AND DEPRESSION: ICD-10-CM

## 2024-12-18 DIAGNOSIS — F41.9 ANXIETY AND DEPRESSION: ICD-10-CM

## 2024-12-18 RX ORDER — BUSPIRONE HYDROCHLORIDE 7.5 MG/1
7.5 TABLET ORAL 2 TIMES DAILY
Qty: 60 TABLET | Refills: 2 | Status: SHIPPED | OUTPATIENT
Start: 2024-12-18

## 2025-01-15 ENCOUNTER — OFFICE VISIT (OUTPATIENT)
Dept: FAMILY MEDICINE | Facility: CLINIC | Age: 30
End: 2025-01-15
Payer: COMMERCIAL

## 2025-01-15 VITALS
BODY MASS INDEX: 27.75 KG/M2 | RESPIRATION RATE: 12 BRPM | DIASTOLIC BLOOD PRESSURE: 78 MMHG | HEART RATE: 97 BPM | TEMPERATURE: 98 F | WEIGHT: 137.63 LBS | HEIGHT: 59 IN | OXYGEN SATURATION: 97 % | SYSTOLIC BLOOD PRESSURE: 122 MMHG

## 2025-01-15 DIAGNOSIS — F90.9 ATTENTION DEFICIT HYPERACTIVITY DISORDER (ADHD), UNSPECIFIED ADHD TYPE: Chronic | ICD-10-CM

## 2025-01-15 DIAGNOSIS — F90.0 ATTENTION DEFICIT HYPERACTIVITY DISORDER (ADHD), PREDOMINANTLY INATTENTIVE TYPE: Primary | Chronic | ICD-10-CM

## 2025-01-15 DIAGNOSIS — F33.9 MAJOR DEPRESSION, RECURRENT, CHRONIC: ICD-10-CM

## 2025-01-15 PROBLEM — F41.9 ANXIETY AND DEPRESSION: Status: RESOLVED | Noted: 2024-10-24 | Resolved: 2025-01-15

## 2025-01-15 PROBLEM — F32.1 CURRENT MODERATE EPISODE OF MAJOR DEPRESSIVE DISORDER WITHOUT PRIOR EPISODE: Status: RESOLVED | Noted: 2023-11-01 | Resolved: 2025-01-15

## 2025-01-15 PROBLEM — F32.A ANXIETY AND DEPRESSION: Status: RESOLVED | Noted: 2024-10-24 | Resolved: 2025-01-15

## 2025-01-15 PROCEDURE — 3078F DIAST BP <80 MM HG: CPT | Mod: CPTII,,, | Performed by: INTERNAL MEDICINE

## 2025-01-15 PROCEDURE — 3008F BODY MASS INDEX DOCD: CPT | Mod: CPTII,,, | Performed by: INTERNAL MEDICINE

## 2025-01-15 PROCEDURE — 3074F SYST BP LT 130 MM HG: CPT | Mod: CPTII,,, | Performed by: INTERNAL MEDICINE

## 2025-01-15 PROCEDURE — 1160F RVW MEDS BY RX/DR IN RCRD: CPT | Mod: CPTII,,, | Performed by: INTERNAL MEDICINE

## 2025-01-15 PROCEDURE — 1159F MED LIST DOCD IN RCRD: CPT | Mod: CPTII,,, | Performed by: INTERNAL MEDICINE

## 2025-01-15 PROCEDURE — 99214 OFFICE O/P EST MOD 30 MIN: CPT | Mod: ,,, | Performed by: INTERNAL MEDICINE

## 2025-01-15 RX ORDER — DEXTROAMPHETAMINE SACCHARATE, AMPHETAMINE ASPARTATE, DEXTROAMPHETAMINE SULFATE AND AMPHETAMINE SULFATE 7.5; 7.5; 7.5; 7.5 MG/1; MG/1; MG/1; MG/1
1 TABLET ORAL 2 TIMES DAILY
Qty: 60 TABLET | Refills: 0 | Status: SHIPPED | OUTPATIENT
Start: 2025-01-15

## 2025-01-15 RX ORDER — DEXTROAMPHETAMINE SACCHARATE, AMPHETAMINE ASPARTATE, DEXTROAMPHETAMINE SULFATE AND AMPHETAMINE SULFATE 7.5; 7.5; 7.5; 7.5 MG/1; MG/1; MG/1; MG/1
30 TABLET ORAL 2 TIMES DAILY
Qty: 60 TABLET | Refills: 0 | Status: SHIPPED | OUTPATIENT
Start: 2025-01-15

## 2025-01-15 NOTE — PROGRESS NOTES
Subjective:      Patient ID: Yue Hickman is a 29 y.o. female.    Chief Complaint: ADHD (3 mth f/u )    HPI  Last wellness 07/03/2024  3 month f/u visit for controlled medication monitoring for ADHD  PMDP reviewed        Lower back pain:  Work up included MRI CTL spine     MRI cervical spine with and without gadolinium- normal alignment with no significant neural compression and no abnormal enhancement.     MRI thoracic spine with and without gadolinium, 10/29/2024- there is normal alignment.  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  The right T12-L1 neuroforamen is enlarged with no significant neural compression.     MRI lumbar spine without gadolinium, 10/17/2024- there is normal alignment.  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    Currently in PT at [facility], also with dry needling and TENS unit  She was referred to neurosurgery, seen by Dr. Ward  Recommendations are to continue serial monitoring with MRI of the nerve sheath tumor at t12-L1  Her care will be transferred to Dr. Mckenzie- May 2025 next MRI and f/u      Neurofibromatosis type 1  Diagnosed at birth  She has had several fibromas removed from her breasts most recently thru derm  Brain MRI completed 7/3/24  FINDINGS:  The ventricles and sulci are normal in size and width respectively.  There is no midline shift or mass effect.  There is no diffusion restriction.     No signal abnormality is identified in the brain.     There are no extra-axial fluid collections.  The midline craniocervical structures are within normal limits.  The normal flow voids are maintained within the major intracranial vascular structures.  A 9 mm mucosal retention cyst or polyp is present in the left posterior ethmoid air cell.  Included paranasal sinuses are otherwise clear.  The mastoid air cells are  "well aerated.     Impression:     None unremarkable noncontrast MRI of the brain.     9 mm mucosal retention cyst or polyp in a left posterior ethmoid air cell.     -patient was referred to [facility] for NF- Dr. Powers     Anemia:  CBC with worsening h/h  Periods are controlled, she is on birth control  No other bleeding observed  After anemia work up ordered, patient was referred to hematology, seen by Dr. [facility], diagnosed with beta zero thalassemia, hereditary hemochromatosis-heterogenous  At this time, patient will f/u regularly with hematology  May need phlebotomy in future     GERD  Has seen GI for this  Diagnosed with esophagitis previously  Not on medication for this  Having more bloating/gas after eating meals        ADHD  Last refill 12/17/24    Health Maintenance Due   Topic Date Due    Pneumococcal Vaccines (Age 0-49) (1 of 2 - PCV) Never done    TETANUS VACCINE  08/09/2017    COVID-19 Vaccine (1 - 2024-25 season) Never done     Review of Systems   Constitutional:  Negative for chills and fever.   HENT:  Negative for congestion, sinus pressure and sore throat.    Respiratory:  Negative for cough and shortness of breath.    Cardiovascular:  Negative for chest pain and palpitations.   Gastrointestinal:  Positive for nausea. Negative for abdominal pain.   Skin:  Negative for rash.       Objective:     Vitals:    01/15/25 1033   BP: 122/78   BP Location: Right arm   Patient Position: Sitting   Pulse: 97   Resp: 12   Temp: 98.4 °F (36.9 °C)   TempSrc: Oral   SpO2: 97%   Weight: 62.4 kg (137 lb 9.6 oz)   Height: 4' 11" (1.499 m)     Physical Exam  Vitals and nursing note reviewed.   Constitutional:       General: She is not in acute distress.     Appearance: Normal appearance. She is not ill-appearing.   HENT:      Head: Normocephalic and atraumatic.   Neurological:      Mental Status: She is alert.       Assessment/Plan:     1. Attention deficit hyperactivity disorder (ADHD), " predominantly inattentive type  -     dextroamphetamine-amphetamine (ADDERALL) 30 mg Tab; Take 1 tablet (30 mg total) by mouth 2 (two) times a day.  Dispense: 60 tablet; Refill: 0  -     dextroamphetamine-amphetamine (ADDERALL) 30 mg Tab; Take 1 tablet (30 mg total) by mouth 2 (two) times a day.  Dispense: 60 tablet; Refill: 0  PMDP reviewed  Medications refilled x 3 mos  Currently in counseling for mood d/o  Avoid caffeine use when taking stimulant medication as this can heart problems, insomnia, anxiety  2. Major depression, recurrent, chronic  PMDP reviewed  Medications refilled x 3 mos  Currently in counseling for mood d/o  Avoid caffeine use when taking stimulant medication as this can heart problems, insomnia, anxiety  3. Attention deficit hyperactivity disorder (ADHD), unspecified ADHD type  -     dextroamphetamine-amphetamine 30 mg Tab; Take 1 tablet (30 mg total) by mouth 2 (two) times a day.  Dispense: 60 tablet; Refill: 0       Follow up in about 3 months (around 4/15/2025) for Follow Up - Controlled Med.    This includes face to face time and non-face to face time preparing to see the patient (eg, review of tests), obtaining and/or reviewing separately obtained history, documenting clinical information in the electronic or other health record, independently interpreting results and communicating results to the patient/family/caregiver, or care coordinator.

## 2025-03-13 ENCOUNTER — LAB VISIT (OUTPATIENT)
Dept: LAB | Facility: HOSPITAL | Age: 30
End: 2025-03-13
Attending: INTERNAL MEDICINE
Payer: COMMERCIAL

## 2025-03-13 ENCOUNTER — TELEPHONE (OUTPATIENT)
Dept: NEUROSURGERY | Facility: CLINIC | Age: 30
End: 2025-03-13
Payer: COMMERCIAL

## 2025-03-13 DIAGNOSIS — D64.9 ANEMIA, UNSPECIFIED TYPE: ICD-10-CM

## 2025-03-13 LAB
ALBUMIN SERPL-MCNC: 3.9 G/DL (ref 3.5–5)
ALBUMIN/GLOB SERPL: 1.1 RATIO (ref 1.1–2)
ALP SERPL-CCNC: 38 UNIT/L (ref 40–150)
ALT SERPL-CCNC: 8 UNIT/L (ref 0–55)
ANION GAP SERPL CALC-SCNC: 8 MEQ/L
AST SERPL-CCNC: 13 UNIT/L (ref 5–34)
BASOPHILS # BLD AUTO: 0.02 X10(3)/MCL
BASOPHILS NFR BLD AUTO: 0.3 %
BILIRUB SERPL-MCNC: 0.9 MG/DL
BUN SERPL-MCNC: 15.7 MG/DL (ref 7–18.7)
CALCIUM SERPL-MCNC: 9.1 MG/DL (ref 8.4–10.2)
CHLORIDE SERPL-SCNC: 107 MMOL/L (ref 98–107)
CO2 SERPL-SCNC: 23 MMOL/L (ref 22–29)
CREAT SERPL-MCNC: 0.68 MG/DL (ref 0.55–1.02)
CREAT/UREA NIT SERPL: 23
EOSINOPHIL # BLD AUTO: 0.06 X10(3)/MCL (ref 0–0.9)
EOSINOPHIL NFR BLD AUTO: 0.9 %
ERYTHROCYTE [DISTWIDTH] IN BLOOD BY AUTOMATED COUNT: 19.5 % (ref 11.5–17)
FERRITIN SERPL-MCNC: 67.17 NG/ML (ref 4.63–204)
GFR SERPLBLD CREATININE-BSD FMLA CKD-EPI: >60 ML/MIN/1.73/M2
GLOBULIN SER-MCNC: 3.4 GM/DL (ref 2.4–3.5)
GLUCOSE SERPL-MCNC: 82 MG/DL (ref 74–100)
HCT VFR BLD AUTO: 34.5 % (ref 37–47)
HGB BLD-MCNC: 11.1 G/DL (ref 12–16)
IMM GRANULOCYTES # BLD AUTO: 0 X10(3)/MCL (ref 0–0.04)
IMM GRANULOCYTES NFR BLD AUTO: 0 %
IRON SATN MFR SERPL: 54 % (ref 20–50)
IRON SERPL-MCNC: 155 UG/DL (ref 50–170)
LYMPHOCYTES # BLD AUTO: 1.65 X10(3)/MCL (ref 0.6–4.6)
LYMPHOCYTES NFR BLD AUTO: 25.6 %
MCH RBC QN AUTO: 23.5 PG (ref 27–31)
MCHC RBC AUTO-ENTMCNC: 32.2 G/DL (ref 33–36)
MCV RBC AUTO: 73.1 FL (ref 80–94)
MONOCYTES # BLD AUTO: 0.38 X10(3)/MCL (ref 0.1–1.3)
MONOCYTES NFR BLD AUTO: 5.9 %
NEUTROPHILS # BLD AUTO: 4.34 X10(3)/MCL (ref 2.1–9.2)
NEUTROPHILS NFR BLD AUTO: 67.3 %
PLATELET # BLD AUTO: 280 X10(3)/MCL (ref 130–400)
PMV BLD AUTO: 9.2 FL (ref 7.4–10.4)
POTASSIUM SERPL-SCNC: 4 MMOL/L (ref 3.5–5.1)
PROT SERPL-MCNC: 7.3 GM/DL (ref 6.4–8.3)
RBC # BLD AUTO: 4.72 X10(6)/MCL (ref 4.2–5.4)
SODIUM SERPL-SCNC: 138 MMOL/L (ref 136–145)
TIBC SERPL-MCNC: 132 UG/DL (ref 70–310)
TIBC SERPL-MCNC: 287 UG/DL (ref 250–450)
TRANSFERRIN SERPL-MCNC: 247 MG/DL (ref 180–382)
WBC # BLD AUTO: 6.45 X10(3)/MCL (ref 4.5–11.5)

## 2025-03-13 PROCEDURE — 80053 COMPREHEN METABOLIC PANEL: CPT

## 2025-03-13 PROCEDURE — 85025 COMPLETE CBC W/AUTO DIFF WBC: CPT

## 2025-03-13 PROCEDURE — 83540 ASSAY OF IRON: CPT

## 2025-03-13 PROCEDURE — 82728 ASSAY OF FERRITIN: CPT

## 2025-03-13 PROCEDURE — 36415 COLL VENOUS BLD VENIPUNCTURE: CPT

## 2025-03-13 NOTE — TELEPHONE ENCOUNTER
I spoke with the patient to let her know that her appointment scheduled with Dr. Mckenzie on 5/26/25 will need to be rescheduled. I was going to put her on for 6/2/25 but she requested a Wednesday appointment as she is off on Wednesday. She is now scheduled with Dr. Mckenzie for 6/4/25 at 10:00. She was compliant w new date and time.

## 2025-03-14 NOTE — PROGRESS NOTES
HEMATOLOGY/ONCOLOGY OFFICE CLINIC VISIT    Visit Information:    Initial Evaluation: 10/21/2024  Referring Provider:   Other providers:  Code status:Not addressed    Diagnosis:  Beta zero Thalassemia  Hereditary hemochromatosis-heterogeneous      CLINICAL HISTORY:       Patient: Yue Hickman is a 29 y.o. female with H/o neurofribromatosis, type 1 kindly referred for Microcytic anemia and Elevated ferritin.     Patient is doing relatively well and besides fatigue and tiredness she is doing well. She is not taking iron. No family h/o blood disorders. No recent illness or hospitalizations.  She stated that she took the DNA- test from  and me that showed that she has hemochromatosis. She is not sure how reliable this test is.     Chief Complaint: 4 Month Follow Up (Patient states she been tired a lot lately, asking for something to increase her energy level.)      Interval History:    03/19/25: Patient presents today for 4 month follow up. She continues to have fatigue, but otherwise she is doing okay.  Blood were discussed with the patient.  Iron studies normal with the exception of iron sat of 54.  Ferritin 67.17.  Pt advised to not have any additional iron through supplements/preworkouts and take Vitamin B12 as tolerated.      Past Medical History:   Diagnosis Date    ADHD (attention deficit hyperactivity disorder)     Anemia, unspecified type     Anxiety and depression 10/24/2024    Cervicalgia     Chronic low back pain 10/24/2024    Current moderate episode of major depressive disorder without prior episode 11/01/2023    Esophagitis     GERD (gastroesophageal reflux disease)     Low back pain, unspecified     Microcytic anemia 10/21/2024    Muscle weakness (generalized)     Neurofibromatosis, type 1 06/07/2023    Other abnormalities of gait and mobility     Pain in thoracic spine     Pain in unspecified hip     Radiculopathy, lumbar region     Sacroiliitis, not elsewhere classified       Past Surgical History:    Procedure Laterality Date    NEUROFIBROMA EXCISION Right 2017    inner upper arm    SINUS SURGERY      TONSILLECTOMY       Family History   Problem Relation Name Age of Onset    Diabetes Mother Tierra     Hypertension Mother Tierra     Cancer Father            Review of patient's allergies indicates:   Allergen Reactions    Animal dander       Current Outpatient Medications on File Prior to Visit   Medication Sig Dispense Refill    acetaminophen (TYLENOL) 325 MG tablet Take 325 mg by mouth every 6 (six) hours as needed for Pain.      busPIRone (BUSPAR) 7.5 MG tablet TAKE 1 TABLET BY MOUTH TWICE A DAY 60 tablet 2    dextroamphetamine-amphetamine (ADDERALL) 30 mg Tab Take 1 tablet (30 mg total) by mouth 2 (two) times a day. 60 tablet 0    dextroamphetamine-amphetamine (ADDERALL) 30 mg Tab Take 1 tablet (30 mg total) by mouth 2 (two) times a day. 60 tablet 0    dextroamphetamine-amphetamine 30 mg Tab Take 1 tablet (30 mg total) by mouth 2 (two) times a day. 60 tablet 0    etonogestreL-ethinyl estradioL (NUVARING) 0.12-0.015 mg/24 hr vaginal ring Place vaginally.      fluticasone propionate (FLONASE) 50 mcg/actuation nasal spray 1 spray by Each Nostril route once daily.      gabapentin (NEURONTIN) 100 MG capsule Take 1 capsule (100 mg total) by mouth every evening. 30 capsule 2    ibuprofen (ADVIL,MOTRIN) 200 MG tablet Take 200 mg by mouth every 6 (six) hours as needed for Pain.      ondansetron (ZOFRAN-ODT) 4 MG TbDL Take 4 mg by mouth every 8 (eight) hours as needed.       No current facility-administered medications on file prior to visit.      Review of Systems   Constitutional:  Negative for activity change, appetite change, chills, fatigue, fever, night sweats and unexpected weight change.   HENT:  Negative for mouth dryness, mouth sores, nosebleeds, sore throat and trouble swallowing.    Eyes: Negative.  Negative for visual disturbance.   Respiratory:  Positive for cough. Negative for shortness of breath.   "  Cardiovascular:  Negative for chest pain, palpitations and leg swelling.   Gastrointestinal:  Positive for abdominal pain. Negative for abdominal distention, blood in stool, change in bowel habit, constipation, diarrhea, nausea and vomiting.   Endocrine: Negative.    Genitourinary:  Negative for dysuria, frequency, hematuria and urgency.   Musculoskeletal:  Negative for arthralgias, back pain, myalgias and neck pain.   Integumentary:  Negative for rash.   Neurological:  Negative for dizziness, tremors, syncope, speech difficulty, weakness, light-headedness, numbness, headaches and memory loss.   Hematological:  Negative for adenopathy. Does not bruise/bleed easily.   Psychiatric/Behavioral:  Negative for confusion and suicidal ideas. The patient is not nervous/anxious.               Vitals:    03/19/25 1450   BP: 112/73   BP Location: Left arm   Patient Position: Sitting   Pulse: 89   Resp: 18   Temp: 98.9 °F (37.2 °C)   TempSrc: Oral   SpO2: 97%   Weight: 62.6 kg (138 lb)   Height: 4' 11" (1.499 m)        Wt Readings from Last 6 Encounters:   03/19/25 62.6 kg (138 lb)   01/15/25 62.4 kg (137 lb 9.6 oz)   11/26/24 62.1 kg (137 lb)   11/08/24 60.8 kg (134 lb)   10/24/24 60.9 kg (134 lb 4.8 oz)   10/21/24 60.6 kg (133 lb 8 oz)     Body mass index is 27.87 kg/m².  Body surface area is 1.61 meters squared.  Physical Exam  Vitals reviewed: LIMITED DUE TO TELEMEDICINE RESTRICTIONS..   Constitutional:       Appearance: Normal appearance.   HENT:      Head: Normocephalic.   Eyes:      Extraocular Movements: Extraocular movements intact.   Pulmonary:      Effort: Pulmonary effort is normal.   Musculoskeletal:      Cervical back: Neck supple.   Neurological:      Mental Status: She is alert.   Psychiatric:         Mood and Affect: Mood normal.         Behavior: Behavior normal.         Thought Content: Thought content normal.         Judgment: Judgment normal.       Laboratory:  CBC with Differential:  Lab Results "   Component Value Date    WBC 6.45 03/13/2025    RBC 4.72 03/13/2025    HGB 11.1 (L) 03/13/2025    HCT 34.5 (L) 03/13/2025    MCV 73.1 (L) 03/13/2025    MCH 23.5 (L) 03/13/2025    MCHC 32.2 (L) 03/13/2025    RDW 19.5 (H) 03/13/2025     03/13/2025    MPV 9.2 03/13/2025        CMP:  Sodium   Date Value Ref Range Status   03/13/2025 138 136 - 145 mmol/L Final     Potassium   Date Value Ref Range Status   03/13/2025 4.0 3.5 - 5.1 mmol/L Final     Chloride   Date Value Ref Range Status   03/13/2025 107 98 - 107 mmol/L Final     CO2   Date Value Ref Range Status   03/13/2025 23 22 - 29 mmol/L Final     Blood Urea Nitrogen   Date Value Ref Range Status   03/13/2025 15.7 7.0 - 18.7 mg/dL Final     Creatinine   Date Value Ref Range Status   03/13/2025 0.68 0.55 - 1.02 mg/dL Final     Calcium   Date Value Ref Range Status   03/13/2025 9.1 8.4 - 10.2 mg/dL Final     Albumin   Date Value Ref Range Status   03/13/2025 3.9 3.5 - 5.0 g/dL Final     Bilirubin Total   Date Value Ref Range Status   03/13/2025 0.9 <=1.5 mg/dL Final     ALP   Date Value Ref Range Status   03/13/2025 38 (L) 40 - 150 unit/L Final     AST   Date Value Ref Range Status   03/13/2025 13 5 - 34 unit/L Final     ALT   Date Value Ref Range Status   03/13/2025 8 0 - 55 unit/L Final     Estimated GFR-Non    Date Value Ref Range Status   03/30/2022 >60        Latest Reference Range & Units 10/16/24 08:01 10/21/24 13:45   Iron 50 - 170 ug/dL 316 (H) 181 (H)   TIBC 250 - 450 ug/dL See Comments 284   UIBC 70 - 310 ug/dL <25 (L) 103   Transferrin 180 - 382 mg/dL 226 245   Ferritin 4.63 - 204.00 ng/mL 68.06 67.94   Folate 7.0 - 31.4 ng/mL 12.3 12.9   Vitamin B12 213 - 816 pg/mL 869 (H) 677   Iron Saturation 20 - 50 % See Comments 64 (H)   (H): Data is abnormally high  (L): Data is abnormally low      Hb electrophoresis:   Beta Globin Sequencing: Positive      The following alterations were detected:   Gene: HBB   Legacy: Beta [id number]-[id number], A>G    HGVS: c.316-2A>G   Genomic: [id number]   Heterozygous   Classification: Beta Zero Thalassemia mutation      Hemochromatosis:  C282Y: Not detected.   H63D: One copy of the H63D variant was identified.   SUMMARY INTERPRETATION:   These results confirm a heterozygous beta thalassemia alteration. In heterozygous individuals, this beta zero thalassemia mutation is typically associated with mild to   moderate anemia (Hgb 10-12 g/dL), marked hypochromia (18.4-19.8 fL), variably elevated Hb A2, and elevated Hb F in some individuals that persists into adulthood   ([facility] 1990 PMID [id number]). Clinical correlation is necessary.     This individual is heterozygous for H63D. This result is consistent with at minimum a carrier status for hereditary hemochromatosis (HH).   The presence of a single H63D variant is unlikely to be of clinical significance in the absence of other disease-causing variants.            Assessment:       1. Delta beta zero thalassemia    2. Microcytic anemia    3. Hemochromatosis, hereditary    4. Neurofibromatosis, type 1        1) Beta zero Thalassemia  Explained to the patient that Beta-zero thalassemia is a disorder of the hemoglobin and a severe form of beta thalassemia, is an autosomal recessive disorder, meaning that a person must inherit two mutated copies of the HBB gene (one from each parent) to develop the condition.    Individuals with beta-zero thalassemia can have a good quality of life,however, the condition can be life-threatening without proper management    2) Hereditary Hemochromatosis  Explained to her that Hemochromatosis is a hereditary disorder that causes the body to absorb too much iron resulting in a buildup of iron in the body   Event though them mutation that the patient has for hemochromatosis is H 63 D that as mentioned above consists with a minimum carrier status for hereditary hemochromatosis and probably not clinical significance.    In this case, besides hereditary  hemochromatosis patient also with beta zero thalassemia and this combination can both result in the inappropriately low production of the hormone hepcidin, leading to an increase in intestinal absorption and excessive iron deposition in the parenchymal cells.     Patient iron decreased from 316 to 181 Most recent 155.  Ferritin 67.17.    3) fatigue    4) Neurofibromatosis, type 1   --she was his specialist in few weeks.  --few new skin lesions      Plan:       Instructed to avoid iron supplementation  Vitamin B12 sublingual supplement to see if help with fatigue  Vitamin B12 injection - once  RTC in 4 months labs prior - virtually  Labs: CBC, CMP, Iron profile, ferritin, TSH, Vitamin B12  We will hold phlebotomy for now but may need in the future.    The patient was seen, interviewed and examined. Pertinent lab and radiology studies were reviewed.   The patient was given ample opportunity to ask questions, and to the best of my abilities, all questions answered to satisfaction; patient demonstrated understanding of what we discussed and agreeable to the plan. Pt instructed to call should develop concerning signs/symptoms or have further questions.   Visit today included increased complexity associated with the care of the episodic problem thalassemia, addressing and managing the longitudinal care of the patient's thalassemia and hemochromatosis.         Alda Drummond MD  Hematology/Oncology          Professional Services   I, Orquidea Leavitt LPN, acted solely as a scribe for and in the presence of Dr. Alda Drummond, who performed these services.

## 2025-03-19 ENCOUNTER — OFFICE VISIT (OUTPATIENT)
Dept: HEMATOLOGY/ONCOLOGY | Facility: CLINIC | Age: 30
End: 2025-03-19
Payer: COMMERCIAL

## 2025-03-19 VITALS
TEMPERATURE: 99 F | OXYGEN SATURATION: 97 % | HEIGHT: 59 IN | SYSTOLIC BLOOD PRESSURE: 112 MMHG | DIASTOLIC BLOOD PRESSURE: 73 MMHG | BODY MASS INDEX: 27.82 KG/M2 | HEART RATE: 89 BPM | WEIGHT: 138 LBS | RESPIRATION RATE: 18 BRPM

## 2025-03-19 DIAGNOSIS — D50.9 MICROCYTIC ANEMIA: ICD-10-CM

## 2025-03-19 DIAGNOSIS — E83.110 HEMOCHROMATOSIS, HEREDITARY: ICD-10-CM

## 2025-03-19 DIAGNOSIS — D56.8: Primary | ICD-10-CM

## 2025-03-19 DIAGNOSIS — Q85.01 NEUROFIBROMATOSIS, TYPE 1: ICD-10-CM

## 2025-03-19 PROCEDURE — 99999 PR PBB SHADOW E&M-EST. PATIENT-LVL IV: CPT | Mod: PBBFAC,,, | Performed by: INTERNAL MEDICINE

## 2025-03-19 RX ORDER — CYANOCOBALAMIN 1000 UG/ML
1000 INJECTION, SOLUTION INTRAMUSCULAR; SUBCUTANEOUS ONCE
Status: SHIPPED | OUTPATIENT
Start: 2025-03-20

## 2025-03-26 ENCOUNTER — TELEPHONE (OUTPATIENT)
Dept: HEMATOLOGY/ONCOLOGY | Facility: CLINIC | Age: 30
End: 2025-03-26
Payer: COMMERCIAL

## 2025-03-26 NOTE — TELEPHONE ENCOUNTER
Patient wanting to know status of approval for B12 injection. States she has not heard from infusion yet to set up.

## 2025-04-28 ENCOUNTER — OFFICE VISIT (OUTPATIENT)
Dept: FAMILY MEDICINE | Facility: CLINIC | Age: 30
End: 2025-04-28
Payer: COMMERCIAL

## 2025-04-28 DIAGNOSIS — F90.9 ATTENTION DEFICIT HYPERACTIVITY DISORDER (ADHD), UNSPECIFIED ADHD TYPE: Chronic | ICD-10-CM

## 2025-04-28 DIAGNOSIS — Z00.00 WELLNESS EXAMINATION: ICD-10-CM

## 2025-04-28 DIAGNOSIS — Z83.49 FAMILY HISTORY OF THYROID DISEASE: ICD-10-CM

## 2025-04-28 DIAGNOSIS — Z13.220 ENCOUNTER FOR LIPID SCREENING FOR CARDIOVASCULAR DISEASE: ICD-10-CM

## 2025-04-28 DIAGNOSIS — F90.0 ATTENTION DEFICIT HYPERACTIVITY DISORDER (ADHD), PREDOMINANTLY INATTENTIVE TYPE: Primary | Chronic | ICD-10-CM

## 2025-04-28 DIAGNOSIS — R73.01 ELEVATED FASTING GLUCOSE: ICD-10-CM

## 2025-04-28 DIAGNOSIS — Z13.6 ENCOUNTER FOR LIPID SCREENING FOR CARDIOVASCULAR DISEASE: ICD-10-CM

## 2025-04-28 PROCEDURE — 1159F MED LIST DOCD IN RCRD: CPT | Mod: CPTII,95,, | Performed by: INTERNAL MEDICINE

## 2025-04-28 PROCEDURE — 98005 SYNCH AUDIO-VIDEO EST LOW 20: CPT | Mod: 95,,, | Performed by: INTERNAL MEDICINE

## 2025-04-28 PROCEDURE — 1160F RVW MEDS BY RX/DR IN RCRD: CPT | Mod: CPTII,95,, | Performed by: INTERNAL MEDICINE

## 2025-04-28 RX ORDER — DEXTROAMPHETAMINE SACCHARATE, AMPHETAMINE ASPARTATE, DEXTROAMPHETAMINE SULFATE AND AMPHETAMINE SULFATE 7.5; 7.5; 7.5; 7.5 MG/1; MG/1; MG/1; MG/1
1 TABLET ORAL 2 TIMES DAILY
Qty: 60 TABLET | Refills: 0 | Status: SHIPPED | OUTPATIENT
Start: 2025-04-28

## 2025-04-28 RX ORDER — DEXTROAMPHETAMINE SACCHARATE, AMPHETAMINE ASPARTATE, DEXTROAMPHETAMINE SULFATE AND AMPHETAMINE SULFATE 7.5; 7.5; 7.5; 7.5 MG/1; MG/1; MG/1; MG/1
30 TABLET ORAL 2 TIMES DAILY
Qty: 60 TABLET | Refills: 0 | Status: SHIPPED | OUTPATIENT
Start: 2025-04-28

## 2025-04-28 NOTE — PROGRESS NOTES
TELEMEDICINE VISIT     Patient ID: Yue Hickman is a 29 y.o. female.  MRN: [id number]  : 1995    Subjective:        TELEMEDICINE  The patient location is: home  The chief complaint leading to consultation is: ADHD     Visit type: Virtual visit with synchronous audio and video    Total time spent with patient: 15 minutes  20 minutes of total time spent on the encounter, which includes face to face time and non-face to face time preparing to see the patient (eg, review of tests), obtaining and/or reviewing separately obtained history, documenting clinical information in the electronic or other health record, independently interpreting results (not separately reported) and communicating results to the patient/family/caregiver, or care coordination (not separately reported).    Each patient to whom he or she provides medical services by telemedicine is:  (1) informed of the relationship between the physician and patient and the respective role of any other health care provider with respect to management of the patient; and (2) notified that he or she may decline to receive medical services by telemedicine and may withdraw from such care at any time.    HPI: HPI       Last wellness 2024  3 month f/u visit for controlled medication monitoring for ADHD  PMDP reviewed  Sx controlled  Reduced caffeine intake, less anxiety           Lower back pain:  Work up included MRI CTL spine     MRI cervical spine with and without gadolinium- normal alignment with no significant neural compression and no abnormal enhancement.     MRI thoracic spine with and without gadolinium, 10/29/2024- there is normal alignment.  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  The right T12-L1 neuroforamen is enlarged with no significant neural compression.     MRI lumbar spine without gadolinium, 10/17/2024- there is normal alignment.  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     Currently in PT at [facility], also with dry needling and TENS unit  She was referred to neurosurgery, seen by Dr. Ward  Recommendations are to continue serial monitoring with MRI of the nerve sheath tumor at t12-L1  Her care will be transferred to Dr. 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  There is no midline shift or mass effect.  There is no diffusion restriction.     No signal abnormality is identified in the brain.     There are no extra-axial fluid collections.  The midline craniocervical structures are within normal limits.  The normal flow voids are maintained within the major intracranial vascular structures.  A 9 mm mucosal retention cyst or polyp is present in the left posterior ethmoid air cell.  Included paranasal sinuses are otherwise clear.  The mastoid air cells are well aerated.     Impression:     None unremarkable noncontrast MRI of the brain.     9 mm mucosal retention cyst or polyp in a left posterior ethmoid air cell.     -patient was referred to [facility] for NF- Dr. Powers     Anemia:  CBC with worsening h/h  Periods are controlled, she is on birth control  No other bleeding observed  After anemia work up ordered, patient was referred to hematology, seen by Dr. Drummond, diagnosed with beta zero thalassemia, hereditary hemochromatosis-heterogenous  At this time, patient will f/u regularly with hematology  May need phlebotomy in future     GERD  Has seen GI for this  Diagnosed with esophagitis previously  Not on medication for this  Having more bloating/gas after eating meals            Health maintenance reviewed with the  patient.  Health maintenance completed:  Health Maintenance Topics with due status: Not Due       Topic Last Completion Date    Cervical Cancer Screening 06/26/2024    RSV Vaccine (Age 60+ and Pregnant patients) Not Due      Health maintenance due:  Health Maintenance Due   Topic Date Due    Pneumococcal Vaccines (Age 0-49) (1 of 2 - PCV) Never done    TETANUS VACCINE  08/09/2017    COVID-19 Vaccine (1 - 2024-25 season) Never done      ROS:  Review of Systems   Constitutional:  Negative for activity change and unexpected weight change.   HENT:  Negative for hearing loss, rhinorrhea and trouble swallowing.    Eyes:  Negative for discharge and visual disturbance.   Respiratory:  Negative for chest tightness and wheezing.    Cardiovascular:  Negative for chest pain and palpitations.   Gastrointestinal:  Negative for blood in stool, constipation, diarrhea and vomiting.   Endocrine: Negative for polydipsia and polyuria.   Genitourinary:  Negative for difficulty urinating, dysuria, hematuria and menstrual problem.   Musculoskeletal:  Positive for arthralgias. Negative for joint swelling and neck pain.   Neurological:  Negative for weakness and headaches.   Psychiatric/Behavioral:  Negative for confusion and dysphoric mood.       History:     Past Medical History:   Diagnosis Date    ADHD (attention deficit hyperactivity disorder)     Anemia, unspecified type     Anxiety and depression 10/24/2024    Cervicalgia     Chronic low back pain 10/24/2024    Current moderate episode of major depressive disorder without prior episode 11/01/2023    Esophagitis     GERD (gastroesophageal reflux disease)     Low back pain, unspecified     Microcytic anemia 10/21/2024    Muscle weakness (generalized)     Neurofibromatosis, type 1 06/07/2023    Other abnormalities of gait and mobility     Pain in thoracic spine     Pain in unspecified hip     Radiculopathy, lumbar region     Sacroiliitis, not elsewhere classified       Past Surgical  History:   Procedure Laterality Date    NEUROFIBROMA EXCISION Right 2017    inner upper arm    SINUS SURGERY      TONSILLECTOMY       Family History   Problem Relation Name Age of Onset    Diabetes Mother Tierra     Hypertension Mother Tierra     Cancer Father        Social History     Tobacco Use    Smoking status: Never     Passive exposure: Yes    Smokeless tobacco: Never   Substance and Sexual Activity    Alcohol use: Not Currently    Drug use: Not Currently    Sexual activity: Not Currently     Partners: Male     Birth control/protection: Condom, Other-see comments     Comment: Nuva ring          Allergies:   Review of patient's allergies indicates:   Allergen Reactions    Animal dander      Objective:   There were no vitals filed for this visit.      Physical Examination: Physical exam was not performed during this virtual visit.  Physical Exam    Labs:   Diagnostic Tests:  Significant Imaging: I have reviewed and interpreted all pertinent imaging results/findings.  MRI Cervical Spine W WO Cont  Narrative: EXAMINATION:  MRI CERVICAL SPINE W WO CONTRAST    CLINICAL HISTORY:  28-year-old woman with neurofibromatosis, type 1 and intermittent neck pain with limited range of motion.    TECHNIQUE:  Sagittal T1, T2, STIR and axial T1 and T2 and sagittal T1 fat sat post contrast and axial T1 post contrast 1.5T magnetic resonance imaging was performed through the cervical spine.  12 mL Clariscan gadolinium contrast was administered intravenously.    COMPARISON:  None    FINDINGS:  Cervical spine alignment is anatomic without spondylolisthesis.  The cervical and visualized upper thoracic vertebral bodies through the T4 level are normal in morphology and there is no acute or chronic fracture.  There is no marrow signal abnormality or osseous lesion.  No prevertebral or paravertebral soft tissue abnormality is identified.  The visualized posterior fossa contents and cervical and upper thoracic spinal cord are normal in  morphology and signal characteristics.  The atlanto-occipital and C1-C2 articulations are normal and there is no stenosis at the foramen magnum.  There is mild intervertebral disc desiccation at C2-C3 through C5-C6.  There is annular bulging at C5-C6 without disc protrusion.  There is no significant facet joint or uncovertebral pathology.  No spinal canal or neural foramen stenosis is identified.  The visualized upper thoracic spine is normal.  There is no abnormal enhancement on the post contrast imaging.  Impression: No acute disease or significant abnormality at the cervical spine.    Electronically signed by: Bandar Palomo  Date:    11/22/2024  Time:    07:42      Laboratory Data:  All pertinent labs have been reviewed.  I have reviewed the following records today:     Details:   [x] Labs [] Internal  [] External    [] Micro [] Internal  [] External    [] Pathology [] Internal  [] External    [x] Imaging [] Internal  [] External    [x] Cardiology Procedures [] Internal  [] External    [x] Provider Records [] Internal  [] External    [] Other [] Internal  [] External      Labs:   Lab Results   Component Value Date    WBC 6.45 03/13/2025    RBC 4.72 03/13/2025    HGB 11.1 (L) 03/13/2025    HCT 34.5 (L) 03/13/2025    MCV 73.1 (L) 03/13/2025    MCH 23.5 (L) 03/13/2025     03/13/2025     03/13/2025    K 4.0 03/13/2025     03/13/2025    BUN 15.7 03/13/2025    CREATININE 0.68 03/13/2025    AST 13 03/13/2025    ALT 8 03/13/2025    BILITOT 0.9 03/13/2025    TSH 1.861 07/03/2024    HGBA1C 4.3 07/03/2024      Medications:     Medication List with Changes/Refills   Current Medications    ACETAMINOPHEN (TYLENOL) 325 MG TABLET    Take 325 mg by mouth every 6 (six) hours as needed for Pain.    BUSPIRONE (BUSPAR) 7.5 MG TABLET    TAKE 1 TABLET BY MOUTH TWICE A DAY    ETONOGESTREL-ETHINYL ESTRADIOL (NUVARING) 0.12-0.015 MG/24 HR VAGINAL RING    Place vaginally.    FLUTICASONE PROPIONATE (FLONASE) 50  MCG/ACTUATION NASAL SPRAY    1 spray by Each Nostril route once daily.    GABAPENTIN (NEURONTIN) 100 MG CAPSULE    Take 1 capsule (100 mg total) by mouth every evening.    IBUPROFEN (ADVIL,MOTRIN) 200 MG TABLET    Take 200 mg by mouth every 6 (six) hours as needed for Pain.    ONDANSETRON (ZOFRAN-ODT) 4 MG TBDL    Take 4 mg by mouth every 8 (eight) hours as needed.   Changed and/or Refilled Medications    Modified Medication Previous Medication    DEXTROAMPHETAMINE-AMPHETAMINE (ADDERALL) 30 MG TAB dextroamphetamine-amphetamine (ADDERALL) 30 mg Tab       Take 1 tablet (30 mg total) by mouth 2 (two) times a day.    Take 1 tablet (30 mg total) by mouth 2 (two) times a day.    DEXTROAMPHETAMINE-AMPHETAMINE (ADDERALL) 30 MG TAB dextroamphetamine-amphetamine (ADDERALL) 30 mg Tab       Take 1 tablet (30 mg total) by mouth 2 (two) times a day.    Take 1 tablet (30 mg total) by mouth 2 (two) times a day.    DEXTROAMPHETAMINE-AMPHETAMINE 30 MG TAB dextroamphetamine-amphetamine 30 mg Tab       Take 1 tablet (30 mg total) by mouth 2 (two) times a day.    Take 1 tablet (30 mg total) by mouth 2 (two) times a day.     Assessment:     1. Attention deficit hyperactivity disorder (ADHD), predominantly inattentive type    2. Attention deficit hyperactivity disorder (ADHD), unspecified ADHD type    3. Wellness examination    4. Encounter for lipid screening for cardiovascular disease    5. Elevated fasting glucose    6. Family history of thyroid disease      Plan:   Yue was seen today for adhd.    Diagnoses and all orders for this visit:    Attention deficit hyperactivity disorder (ADHD), predominantly inattentive type  -     dextroamphetamine-amphetamine (ADDERALL) 30 mg Tab; Take 1 tablet (30 mg total) by mouth 2 (two) times a day.  -     dextroamphetamine-amphetamine (ADDERALL) 30 mg Tab; Take 1 tablet (30 mg total) by mouth 2 (two) times a day.  PMDP reviewed  Refills for meds provided   Attention deficit hyperactivity disorder  (ADHD), unspecified ADHD type  -     dextroamphetamine-amphetamine 30 mg Tab; Take 1 tablet (30 mg total) by mouth 2 (two) times a day.    Wellness examination  -     Comprehensive Metabolic Panel; Future  -     Lipid Panel; Future  -     Hemoglobin A1C; Future  -     Urinalysis; Future  -     TSH; Future    Encounter for lipid screening for cardiovascular disease  -     Comprehensive Metabolic Panel; Future  -     Lipid Panel; Future  -     Hemoglobin A1C; Future  -     Urinalysis; Future  -     TSH; Future    Elevated fasting glucose  -     Comprehensive Metabolic Panel; Future  -     Lipid Panel; Future  -     Hemoglobin A1C; Future  -     Urinalysis; Future  -     TSH; Future    Family history of thyroid disease  -     Comprehensive Metabolic Panel; Future  -     Lipid Panel; Future  -     Hemoglobin A1C; Future  -     Urinalysis; Future  -     TSH; Future        Follow Up:   Follow up in about 3 months (around 7/28/2025) for Annual Wellness with labs.    I spent greater than 20 minutes today both in chart review and greater than 50% of that time in discussion with the patient regarding health maintenance, diagnoses, diagnostic tests, medications, treatments, symptom management, expected results and adverse effects. Patient verbalized understanding and all questions were answered.

## 2025-05-16 RX ORDER — ONDANSETRON 4 MG/1
TABLET, ORALLY DISINTEGRATING ORAL
Qty: 30 TABLET | Refills: 0 | Status: SHIPPED | OUTPATIENT
Start: 2025-05-16

## 2025-05-28 ENCOUNTER — OFFICE VISIT (OUTPATIENT)
Dept: FAMILY MEDICINE | Facility: CLINIC | Age: 30
End: 2025-05-28
Payer: COMMERCIAL

## 2025-05-28 VITALS
HEIGHT: 59 IN | BODY MASS INDEX: 25.38 KG/M2 | HEART RATE: 88 BPM | DIASTOLIC BLOOD PRESSURE: 76 MMHG | OXYGEN SATURATION: 97 % | SYSTOLIC BLOOD PRESSURE: 116 MMHG | WEIGHT: 125.88 LBS | TEMPERATURE: 98 F | RESPIRATION RATE: 20 BRPM

## 2025-05-28 DIAGNOSIS — Q85.01 NEUROFIBROMATOSIS, TYPE 1: Primary | ICD-10-CM

## 2025-05-28 DIAGNOSIS — G89.29 CHRONIC LOW BACK PAIN, UNSPECIFIED BACK PAIN LATERALITY, UNSPECIFIED WHETHER SCIATICA PRESENT: ICD-10-CM

## 2025-05-28 DIAGNOSIS — M54.50 CHRONIC LOW BACK PAIN, UNSPECIFIED BACK PAIN LATERALITY, UNSPECIFIED WHETHER SCIATICA PRESENT: ICD-10-CM

## 2025-05-28 PROCEDURE — 3008F BODY MASS INDEX DOCD: CPT | Mod: CPTII,,, | Performed by: NURSE PRACTITIONER

## 2025-05-28 PROCEDURE — 99214 OFFICE O/P EST MOD 30 MIN: CPT | Mod: ,,, | Performed by: NURSE PRACTITIONER

## 2025-05-28 PROCEDURE — 1159F MED LIST DOCD IN RCRD: CPT | Mod: CPTII,,, | Performed by: NURSE PRACTITIONER

## 2025-05-28 PROCEDURE — 3078F DIAST BP <80 MM HG: CPT | Mod: CPTII,,, | Performed by: NURSE PRACTITIONER

## 2025-05-28 PROCEDURE — 3074F SYST BP LT 130 MM HG: CPT | Mod: CPTII,,, | Performed by: NURSE PRACTITIONER

## 2025-05-28 PROCEDURE — 1160F RVW MEDS BY RX/DR IN RCRD: CPT | Mod: CPTII,,, | Performed by: NURSE PRACTITIONER

## 2025-05-28 NOTE — PROGRESS NOTES
Subjective:      Patient ID: Yue Hickman is a 29 y.o. Black or  female      Chief Complaint: Back Pain       Past Medical History:   Diagnosis Date    ADHD (attention deficit hyperactivity disorder)     Anemia, unspecified type     Anxiety and depression 10/24/2024    Cervicalgia     Chronic low back pain 10/24/2024    Current moderate episode of major depressive disorder without prior episode 11/01/2023    Esophagitis     GERD (gastroesophageal reflux disease)     Low back pain, unspecified     Microcytic anemia 10/21/2024    Muscle weakness (generalized)     Neurofibromatosis, type 1 06/07/2023    Other abnormalities of gait and mobility     Pain in thoracic spine     Pain in unspecified hip     Radiculopathy, lumbar region     Sacroiliitis, not elsewhere classified         HPI  History of Present Illness      BACK PAIN  Ms. Hickman reports back pain for approximately the last 1.5 years, related to tumors (neurofibromas) growing in her spine discovered last year. She has been diagnosed with neurofibromatosis type 1. The back pain makes it difficult for her to work full shifts at [employer], where she is required to lift heavy boxes. By 8 hours into her shift, she feels exhausted and in pain. Lifting anything over 35 lbs is very challenging, and even lifting 35 lbs is uncomfortable.     She has been evaluated by a neurosurgeon, with another appointment scheduled for August (rescheduled from June). The neurosurgeon initially did not see compression of nerves and was uncertain the tumors were causing the pain, but admitted to not being familiar with neurofibromatosis. She is awaiting further evaluation and possibly another MRI to determine the next steps. A specialist mentioned that these types of tumors are not typically removed and suggested a cream for treatment.    For pain management, she takes Gabapentin, Tylenol, and ibuprofen as needed; and gets massages. She previously underwent physical  therapy but discontinued due to cost.        Patient Care Team:  Rosie Alfonso MD as PCP - General (Internal Medicine)    Review of Systems   Constitutional: Negative.  Negative for appetite change, chills and fever.   HENT: Negative.     Eyes: Negative.  Negative for discharge and redness.   Respiratory: Negative.  Negative for shortness of breath.    Cardiovascular: Negative.  Negative for chest pain.   Gastrointestinal: Negative.    Endocrine: Negative.    Genitourinary: Negative.    Musculoskeletal:  Positive for back pain.   Integumentary:  Negative.   Allergic/Immunologic: Negative.    Neurological: Negative.    Psychiatric/Behavioral: Negative.     All other systems reviewed and are negative.        Objective:      Vitals:    05/28/25 1403   BP: 116/76   Pulse: 88   Resp: 20   Temp: 97.9 °F (36.6 °C)      Body mass index is 25.43 kg/m².     Physical Exam  Vitals reviewed.   Constitutional:       Appearance: Normal appearance.   HENT:      Head: Normocephalic.      Mouth/Throat:      Mouth: Mucous membranes are moist.   Eyes:      Conjunctiva/sclera: Conjunctivae normal.      Pupils: Pupils are equal, round, and reactive to light.   Cardiovascular:      Rate and Rhythm: Normal rate and regular rhythm.   Pulmonary:      Effort: Pulmonary effort is normal. No respiratory distress.      Breath sounds: Normal breath sounds.   Musculoskeletal:         General: Normal range of motion.      Cervical back: Normal range of motion and neck supple.   Skin:     General: Skin is warm and dry.   Neurological:      Mental Status: She is alert and oriented to person, place, and time.   Psychiatric:         Mood and Affect: Mood normal.          Current Medications[1]    Assessment & Plan:   Assessment & Plan      IMPRESSION:  - Reviewed history of back pain related to neurofibroma tumors in the spine.  - Considered work accommodation needs based on physical limitations.  - Assessed current pain management regimen and its  effectiveness.  - Evaluated need for updated work restrictions documentation.    NEUROFIBROMATOSIS   - Ms. Hickman has been experiencing back pain for the last 1.5 years due to neurofibromatosis tumors  - Ms. Hickman is following neurosurgeon   - Ms. Hickman is scheduled to see another neurosurgeon in August for further evaluation, with updated MRI.  - Ms Hickman is unable to work shifts that are > 8 hours and she is unable to lift anything >35 pounds    CHRONIC BACK PAIN MANAGEMENT:  - Recommend Tylenol and ibuprofen as needed for back pain management.  - Ms. Hickman experiences back pain after 8 hours of work, especially when lifting heavy items over 35 lbs.  - Ms. Hickman reports both pain and exhaustion after extended work periods.    WORK-RELATED ACCOMMODATIONS:  - Recommend work accommodations to avoid exacerbating pain.  - Ms. Hickman works at [employer] lifting heavy boxes and has difficulty with 10-hour shifts.  - Instructed the patient to limit work shifts to no more than 8 hours per day and avoid lifting items heavier than 35 lbs.  - Will complete work accommodation form by Friday and fax it to the provided number.    FOLLOW-UP:  - Recommend follow up as needed if there are any questions or if changes are needed on the accommodation form.       Problem List Items Addressed This Visit    None        This note was generated with the assistance of ambient listening technology. Verbal consent was obtained by the patient and accompanying visitor(s) for the recording of patient appointment to facilitate this note. I attest to having reviewed and edited the generated note for accuracy, though some syntax or spelling errors may persist. Please contact the author of this note for any clarification.    Total time (30 minutes).This includes face to face time and non-face to face time preparing to see the patient (eg, review of tests), obtaining and/or reviewing separately obtained history, documenting clinical  information in the electronic or other health record, independently interpreting result. completing paperwork, and communicating results to the patient/family/caregiver, or care coordinator.              [1]   Current Outpatient Medications:     busPIRone (BUSPAR) 7.5 MG tablet, TAKE 1 TABLET BY MOUTH TWICE A DAY, Disp: 60 tablet, Rfl: 2    dextroamphetamine-amphetamine (ADDERALL) 30 mg Tab, Take 1 tablet (30 mg total) by mouth 2 (two) times a day., Disp: 60 tablet, Rfl: 0    dextroamphetamine-amphetamine (ADDERALL) 30 mg Tab, Take 1 tablet (30 mg total) by mouth 2 (two) times a day., Disp: 60 tablet, Rfl: 0    dextroamphetamine-amphetamine 30 mg Tab, Take 1 tablet (30 mg total) by mouth 2 (two) times a day., Disp: 60 tablet, Rfl: 0    etonogestreL-ethinyl estradioL (NUVARING) 0.12-0.015 mg/24 hr vaginal ring, Place vaginally., Disp: , Rfl:     fluticasone propionate (FLONASE) 50 mcg/actuation nasal spray, 1 spray by Each Nostril route once daily., Disp: , Rfl:     gabapentin (NEURONTIN) 100 MG capsule, Take 1 capsule (100 mg total) by mouth every evening., Disp: 30 capsule, Rfl: 2    ibuprofen (ADVIL,MOTRIN) 200 MG tablet, Take 200 mg by mouth every 6 (six) hours as needed for Pain., Disp: , Rfl:     ondansetron (ZOFRAN-ODT) 4 MG TbDL, TAKE 1 TABLET BY MOUTH EVERY 8  HOURS AS NEEDED NAUSEA, Disp: 30 tablet, Rfl: 0    acetaminophen (TYLENOL) 325 MG tablet, Take 325 mg by mouth every 6 (six) hours as needed for Pain., Disp: , Rfl:     Current Facility-Administered Medications:     cyanocobalamin injection 1,000 mcg, 1,000 mcg, Intramuscular, Once, Alda Drummond MD

## 2025-06-17 ENCOUNTER — TELEPHONE (OUTPATIENT)
Dept: FAMILY MEDICINE | Facility: CLINIC | Age: 30
End: 2025-06-17
Payer: COMMERCIAL

## 2025-06-17 NOTE — TELEPHONE ENCOUNTER
Copied from CRM #[id number]. Topic: General Inquiry - Patient Advice  >> Jun 17, 2025 10:54 AM Olesya wrote:  .Who Called: Yue Hickman    Patient is returning phone call    Who Left Message for Patient:  Does the patient know what this is regarding?:PT calling in regards to accomodation letter for work, stated they denied it due to it saying permanent--needs it to state 6 mths basis on it       Preferred Method of Contact: Phone Call  Patient's Preferred Phone Number on File: [phone]   Best Call Back Number, if different:  Additional Information:

## 2025-06-17 NOTE — TELEPHONE ENCOUNTER
Patient called to correct FMLA paperwork. Stated needs to read 6 month basis rather than permanent. Patient was seen 5/28 by reggie to fill out FMLA forms. Please follow up with patient regarding changes/corrections. Thank you

## 2025-06-17 NOTE — TELEPHONE ENCOUNTER
Copied from CRM #[id number]. Topic: General Inquiry - Return Call  >> Jun 17, 2025  1:06 PM Ivet Jamison wrote:  Who Called: Yue Vick    Patient is returning phone call    Who Left Message for Patient:Rashawn   Does the patient know what this is regarding?:N/A      Preferred Method of Contact: Phone Call  Patient's Preferred Phone Number on File: [phone]   Best Call Back Number, if different:  Additional Information:

## 2025-07-07 ENCOUNTER — TELEPHONE (OUTPATIENT)
Dept: FAMILY MEDICINE | Facility: CLINIC | Age: 30
End: 2025-07-07
Payer: COMMERCIAL

## 2025-07-07 ENCOUNTER — RESULTS FOLLOW-UP (OUTPATIENT)
Dept: FAMILY MEDICINE | Facility: CLINIC | Age: 30
End: 2025-07-07

## 2025-07-07 ENCOUNTER — LAB VISIT (OUTPATIENT)
Dept: LAB | Facility: HOSPITAL | Age: 30
End: 2025-07-07
Attending: INTERNAL MEDICINE
Payer: COMMERCIAL

## 2025-07-07 DIAGNOSIS — G89.29 CHRONIC LOW BACK PAIN, UNSPECIFIED BACK PAIN LATERALITY, UNSPECIFIED WHETHER SCIATICA PRESENT: Primary | ICD-10-CM

## 2025-07-07 DIAGNOSIS — Z13.220 ENCOUNTER FOR LIPID SCREENING FOR CARDIOVASCULAR DISEASE: ICD-10-CM

## 2025-07-07 DIAGNOSIS — Z00.00 WELLNESS EXAMINATION: ICD-10-CM

## 2025-07-07 DIAGNOSIS — R73.01 ELEVATED FASTING GLUCOSE: ICD-10-CM

## 2025-07-07 DIAGNOSIS — Z83.49 FAMILY HISTORY OF THYROID DISEASE: ICD-10-CM

## 2025-07-07 DIAGNOSIS — G89.29 CHRONIC LOW BACK PAIN, UNSPECIFIED BACK PAIN LATERALITY, UNSPECIFIED WHETHER SCIATICA PRESENT: ICD-10-CM

## 2025-07-07 DIAGNOSIS — M54.50 CHRONIC LOW BACK PAIN, UNSPECIFIED BACK PAIN LATERALITY, UNSPECIFIED WHETHER SCIATICA PRESENT: ICD-10-CM

## 2025-07-07 DIAGNOSIS — M54.50 CHRONIC LOW BACK PAIN, UNSPECIFIED BACK PAIN LATERALITY, UNSPECIFIED WHETHER SCIATICA PRESENT: Primary | ICD-10-CM

## 2025-07-07 DIAGNOSIS — Z13.6 ENCOUNTER FOR LIPID SCREENING FOR CARDIOVASCULAR DISEASE: ICD-10-CM

## 2025-07-07 LAB
ACCEPTIBLE SP GR UR QL: 1.02 (ref 1–1.03)
ALBUMIN SERPL-MCNC: 3.8 G/DL (ref 3.5–5)
ALBUMIN/GLOB SERPL: 1 RATIO (ref 1.1–2)
ALP SERPL-CCNC: 31 UNIT/L (ref 40–150)
ALT SERPL-CCNC: 9 UNIT/L (ref 0–55)
AMPHET UR QL SCN: POSITIVE
ANION GAP SERPL CALC-SCNC: 7 MEQ/L
AST SERPL-CCNC: 11 UNIT/L (ref 11–45)
BARBITURATE SCN PRESENT UR: NEGATIVE
BENZODIAZ UR QL SCN: NEGATIVE
BILIRUB SERPL-MCNC: 1.1 MG/DL
BILIRUB UR QL STRIP.AUTO: NEGATIVE
BUN SERPL-MCNC: 13.6 MG/DL (ref 7–18.7)
CALCIUM SERPL-MCNC: 9.3 MG/DL (ref 8.4–10.2)
CANNABINOIDS UR QL SCN: NEGATIVE
CHLORIDE SERPL-SCNC: 109 MMOL/L (ref 98–107)
CHOLEST SERPL-MCNC: 166 MG/DL
CHOLEST/HDLC SERPL: 3 {RATIO} (ref 0–5)
CLARITY UR: ABNORMAL
CO2 SERPL-SCNC: 23 MMOL/L (ref 22–29)
COCAINE UR QL SCN: NEGATIVE
COLOR UR AUTO: ABNORMAL
CREAT SERPL-MCNC: 0.66 MG/DL (ref 0.55–1.02)
CREAT/UREA NIT SERPL: 21
EST. AVERAGE GLUCOSE BLD GHB EST-MCNC: 76.7 MG/DL
FENTANYL UR QL SCN: NEGATIVE
GFR SERPLBLD CREATININE-BSD FMLA CKD-EPI: >60 ML/MIN/1.73/M2
GLOBULIN SER-MCNC: 3.8 GM/DL (ref 2.4–3.5)
GLUCOSE SERPL-MCNC: 83 MG/DL (ref 74–100)
GLUCOSE UR QL STRIP: NEGATIVE
HBA1C MFR BLD: 4.3 %
HDLC SERPL-MCNC: 63 MG/DL (ref 35–60)
HGB UR QL STRIP: NEGATIVE
KETONES UR QL STRIP: NEGATIVE
LDLC SERPL CALC-MCNC: 88 MG/DL (ref 50–140)
LEUKOCYTE ESTERASE UR QL STRIP: NEGATIVE
MDMA UR QL SCN: NEGATIVE
NITRITE UR QL STRIP: NEGATIVE
OPIATES UR QL SCN: NEGATIVE
PCP UR QL: NEGATIVE
PH UR STRIP: 6 [PH]
PH UR: 6 [PH] (ref 3–11)
POTASSIUM SERPL-SCNC: 4.2 MMOL/L (ref 3.5–5.1)
PROT SERPL-MCNC: 7.6 GM/DL (ref 6.4–8.3)
PROT UR QL STRIP: NEGATIVE
SODIUM SERPL-SCNC: 139 MMOL/L (ref 136–145)
SP GR UR STRIP.AUTO: 1.02 (ref 1–1.03)
TRIGL SERPL-MCNC: 77 MG/DL (ref 37–140)
TSH SERPL-ACNC: 2.37 UIU/ML (ref 0.35–4.94)
UROBILINOGEN UR STRIP-ACNC: 0.2
VLDLC SERPL CALC-MCNC: 15 MG/DL

## 2025-07-07 PROCEDURE — 84443 ASSAY THYROID STIM HORMONE: CPT

## 2025-07-07 PROCEDURE — 36415 COLL VENOUS BLD VENIPUNCTURE: CPT

## 2025-07-07 PROCEDURE — 81003 URINALYSIS AUTO W/O SCOPE: CPT

## 2025-07-07 PROCEDURE — 83036 HEMOGLOBIN GLYCOSYLATED A1C: CPT

## 2025-07-07 PROCEDURE — 80307 DRUG TEST PRSMV CHEM ANLYZR: CPT

## 2025-07-07 PROCEDURE — 80053 COMPREHEN METABOLIC PANEL: CPT

## 2025-07-07 PROCEDURE — 80061 LIPID PANEL: CPT

## 2025-07-07 NOTE — TELEPHONE ENCOUNTER
Copied from CRM #[id number]. Topic: General Inquiry - Patient Advice  >> Jul 7, 2025  7:35 AM Ivet Jamison wrote:  Who Called: Yue Vick    Caller is requesting assistance/information from provider's office.    Symptoms (please be specific): N/A   How long has patient had these symptoms:  N/A  List of preferred pharmacies on file (remove unneeded): [unfilled]  If different, enter pharmacy into here including location and phone number: N/A      Preferred Method of Contact: Phone Call  Patient's Preferred Phone Number on File: [phone]   Best Call Back Number, if different:  Additional Information: pt stated she would like a call back. Pt did not want to clarify

## 2025-07-17 ENCOUNTER — LAB VISIT (OUTPATIENT)
Dept: LAB | Facility: HOSPITAL | Age: 30
End: 2025-07-17
Attending: INTERNAL MEDICINE
Payer: COMMERCIAL

## 2025-07-17 DIAGNOSIS — E83.110 HEMOCHROMATOSIS, HEREDITARY: ICD-10-CM

## 2025-07-17 DIAGNOSIS — D56.8: ICD-10-CM

## 2025-07-17 DIAGNOSIS — Q85.01 NEUROFIBROMATOSIS, TYPE 1: ICD-10-CM

## 2025-07-17 DIAGNOSIS — D50.9 MICROCYTIC ANEMIA: ICD-10-CM

## 2025-07-17 LAB
ALBUMIN SERPL-MCNC: 3.8 G/DL (ref 3.5–5)
ALBUMIN/GLOB SERPL: 1 RATIO (ref 1.1–2)
ALP SERPL-CCNC: 29 UNIT/L (ref 40–150)
ALT SERPL-CCNC: 9 UNIT/L (ref 0–55)
ANION GAP SERPL CALC-SCNC: 8 MEQ/L
AST SERPL-CCNC: 11 UNIT/L (ref 11–45)
BASOPHILS # BLD AUTO: 0.01 X10(3)/MCL
BASOPHILS NFR BLD AUTO: 0.2 %
BILIRUB SERPL-MCNC: 0.9 MG/DL
BUN SERPL-MCNC: 12.1 MG/DL (ref 7–18.7)
CALCIUM SERPL-MCNC: 9.3 MG/DL (ref 8.4–10.2)
CHLORIDE SERPL-SCNC: 107 MMOL/L (ref 98–107)
CO2 SERPL-SCNC: 24 MMOL/L (ref 22–29)
CREAT SERPL-MCNC: 0.74 MG/DL (ref 0.55–1.02)
CREAT/UREA NIT SERPL: 16
EOSINOPHIL # BLD AUTO: 0.02 X10(3)/MCL (ref 0–0.9)
EOSINOPHIL NFR BLD AUTO: 0.4 %
ERYTHROCYTE [DISTWIDTH] IN BLOOD BY AUTOMATED COUNT: 19.2 % (ref 11.5–17)
FERRITIN SERPL-MCNC: 61.36 NG/ML (ref 4.63–204)
GFR SERPLBLD CREATININE-BSD FMLA CKD-EPI: >60 ML/MIN/1.73/M2
GLOBULIN SER-MCNC: 3.7 GM/DL (ref 2.4–3.5)
GLUCOSE SERPL-MCNC: 84 MG/DL (ref 74–100)
HCT VFR BLD AUTO: 33.8 % (ref 37–47)
HGB BLD-MCNC: 11 G/DL (ref 12–16)
IMM GRANULOCYTES # BLD AUTO: 0 X10(3)/MCL (ref 0–0.04)
IMM GRANULOCYTES NFR BLD AUTO: 0 %
IRON SATN MFR SERPL: 41 % (ref 20–50)
IRON SERPL-MCNC: 117 UG/DL (ref 50–170)
LYMPHOCYTES # BLD AUTO: 1.58 X10(3)/MCL (ref 0.6–4.6)
LYMPHOCYTES NFR BLD AUTO: 29.9 %
MCH RBC QN AUTO: 24.2 PG (ref 27–31)
MCHC RBC AUTO-ENTMCNC: 32.5 G/DL (ref 33–36)
MCV RBC AUTO: 74.3 FL (ref 80–94)
MONOCYTES # BLD AUTO: 0.31 X10(3)/MCL (ref 0.1–1.3)
MONOCYTES NFR BLD AUTO: 5.9 %
NEUTROPHILS # BLD AUTO: 3.37 X10(3)/MCL (ref 2.1–9.2)
NEUTROPHILS NFR BLD AUTO: 63.6 %
PLATELET # BLD AUTO: 306 X10(3)/MCL (ref 130–400)
PMV BLD AUTO: 9.8 FL (ref 7.4–10.4)
POTASSIUM SERPL-SCNC: 4.1 MMOL/L (ref 3.5–5.1)
PROT SERPL-MCNC: 7.5 GM/DL (ref 6.4–8.3)
RBC # BLD AUTO: 4.55 X10(6)/MCL (ref 4.2–5.4)
SODIUM SERPL-SCNC: 139 MMOL/L (ref 136–145)
TIBC SERPL-MCNC: 165 UG/DL (ref 70–310)
TIBC SERPL-MCNC: 282 UG/DL (ref 250–450)
TRANSFERRIN SERPL-MCNC: 243 MG/DL (ref 180–382)
TSH SERPL-ACNC: 1.06 UIU/ML (ref 0.35–4.94)
VIT B12 SERPL-MCNC: 507 PG/ML (ref 213–816)
WBC # BLD AUTO: 5.29 X10(3)/MCL (ref 4.5–11.5)

## 2025-07-17 PROCEDURE — 80053 COMPREHEN METABOLIC PANEL: CPT

## 2025-07-17 PROCEDURE — 36415 COLL VENOUS BLD VENIPUNCTURE: CPT

## 2025-07-17 PROCEDURE — 82728 ASSAY OF FERRITIN: CPT

## 2025-07-17 PROCEDURE — 83540 ASSAY OF IRON: CPT

## 2025-07-17 PROCEDURE — 82607 VITAMIN B-12: CPT

## 2025-07-17 PROCEDURE — 85025 COMPLETE CBC W/AUTO DIFF WBC: CPT

## 2025-07-17 PROCEDURE — 84443 ASSAY THYROID STIM HORMONE: CPT

## 2025-07-21 ENCOUNTER — OFFICE VISIT (OUTPATIENT)
Dept: FAMILY MEDICINE | Facility: CLINIC | Age: 30
End: 2025-07-21
Payer: COMMERCIAL

## 2025-07-21 VITALS
DIASTOLIC BLOOD PRESSURE: 72 MMHG | HEIGHT: 59 IN | WEIGHT: 160 LBS | RESPIRATION RATE: 18 BRPM | BODY MASS INDEX: 32.25 KG/M2 | HEART RATE: 96 BPM | TEMPERATURE: 98 F | OXYGEN SATURATION: 99 % | SYSTOLIC BLOOD PRESSURE: 110 MMHG

## 2025-07-21 DIAGNOSIS — G89.29 CHRONIC BILATERAL LOW BACK PAIN WITH SCIATICA, SCIATICA LATERALITY UNSPECIFIED: ICD-10-CM

## 2025-07-21 DIAGNOSIS — Z00.00 WELLNESS EXAMINATION: Primary | ICD-10-CM

## 2025-07-21 DIAGNOSIS — M54.42 CHRONIC BILATERAL LOW BACK PAIN WITH SCIATICA, SCIATICA LATERALITY UNSPECIFIED: ICD-10-CM

## 2025-07-21 DIAGNOSIS — M54.41 CHRONIC BILATERAL LOW BACK PAIN WITH SCIATICA, SCIATICA LATERALITY UNSPECIFIED: ICD-10-CM

## 2025-07-21 DIAGNOSIS — F90.9 ATTENTION DEFICIT HYPERACTIVITY DISORDER (ADHD), UNSPECIFIED ADHD TYPE: Chronic | ICD-10-CM

## 2025-07-21 DIAGNOSIS — F90.0 ATTENTION DEFICIT HYPERACTIVITY DISORDER (ADHD), PREDOMINANTLY INATTENTIVE TYPE: Chronic | ICD-10-CM

## 2025-07-21 DIAGNOSIS — D56.8: ICD-10-CM

## 2025-07-21 DIAGNOSIS — Q85.01 NEUROFIBROMATOSIS, TYPE 1: ICD-10-CM

## 2025-07-21 PROCEDURE — 99395 PREV VISIT EST AGE 18-39: CPT | Mod: ,,, | Performed by: INTERNAL MEDICINE

## 2025-07-21 PROCEDURE — 1159F MED LIST DOCD IN RCRD: CPT | Mod: CPTII,,, | Performed by: INTERNAL MEDICINE

## 2025-07-21 PROCEDURE — 1160F RVW MEDS BY RX/DR IN RCRD: CPT | Mod: CPTII,,, | Performed by: INTERNAL MEDICINE

## 2025-07-21 PROCEDURE — 3078F DIAST BP <80 MM HG: CPT | Mod: CPTII,,, | Performed by: INTERNAL MEDICINE

## 2025-07-21 PROCEDURE — 3044F HG A1C LEVEL LT 7.0%: CPT | Mod: CPTII,,, | Performed by: INTERNAL MEDICINE

## 2025-07-21 PROCEDURE — 3008F BODY MASS INDEX DOCD: CPT | Mod: CPTII,,, | Performed by: INTERNAL MEDICINE

## 2025-07-21 PROCEDURE — 3074F SYST BP LT 130 MM HG: CPT | Mod: CPTII,,, | Performed by: INTERNAL MEDICINE

## 2025-07-21 RX ORDER — DEXTROAMPHETAMINE SACCHARATE, AMPHETAMINE ASPARTATE, DEXTROAMPHETAMINE SULFATE AND AMPHETAMINE SULFATE 7.5; 7.5; 7.5; 7.5 MG/1; MG/1; MG/1; MG/1
1 TABLET ORAL 2 TIMES DAILY
Qty: 60 TABLET | Refills: 0 | Status: SHIPPED | OUTPATIENT
Start: 2025-07-21 | End: 2025-07-23 | Stop reason: SDUPTHER

## 2025-07-21 RX ORDER — DEXTROAMPHETAMINE SACCHARATE, AMPHETAMINE ASPARTATE, DEXTROAMPHETAMINE SULFATE AND AMPHETAMINE SULFATE 7.5; 7.5; 7.5; 7.5 MG/1; MG/1; MG/1; MG/1
30 TABLET ORAL 2 TIMES DAILY
Qty: 60 TABLET | Refills: 0 | Status: SHIPPED | OUTPATIENT
Start: 2025-07-21

## 2025-07-21 RX ORDER — DEXTROAMPHETAMINE SACCHARATE, AMPHETAMINE ASPARTATE, DEXTROAMPHETAMINE SULFATE AND AMPHETAMINE SULFATE 7.5; 7.5; 7.5; 7.5 MG/1; MG/1; MG/1; MG/1
1 TABLET ORAL 2 TIMES DAILY
Qty: 60 TABLET | Refills: 0 | Status: SHIPPED | OUTPATIENT
Start: 2025-07-21

## 2025-07-21 NOTE — PROGRESS NOTES
Subjective:      Patient ID: Yue Hickman is a 29 y.o. female.    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  There is no midline shift or mass effect.  There is no diffusion restriction.     No signal abnormality is identified in the brain.     There are no extra-axial fluid collections.  The midline craniocervical structures are within normal limits.  The normal flow voids are maintained within the major intracranial vascular structures.  A 9 mm mucosal retention cyst or polyp is present in the left posterior ethmoid air cell.  Included paranasal sinuses are otherwise clear.  The mastoid air cells are well aerated.     Impression:     None unremarkable noncontrast MRI of the brain.     9 mm mucosal retention cyst or polyp in a left posterior ethmoid air cell.     -patient was referred to [place] [facility] for NF- Dr. Powers  -has seen MD, was told to get mammogram now, ordered and scheduled, was also told for other surveillance measures, was unable to see in EPIC she will print out and bring to us at next OV       1. Delta beta zero thalassemia    2. Microcytic anemia    3. Hemochromatosis, hereditary    -hematology at [facility] [place] following, Dr. Drummond  -iron levels stable at 67 per last OV notes  -patient was told to avoid iron supplementation, add vitamin B12 and f/u in July    Lower back pain:  Work up included MRI CTL spine     MRI cervical spine with and without gadolinium- normal alignment with no significant neural compression and no abnormal enhancement.     MRI thoracic  spine with and without gadolinium, 10/29/2024- there is normal alignment.  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  The right T12-L1 neuroforamen is enlarged with no significant neural compression.     MRI lumbar spine without gadolinium, 10/17/2024- there is normal alignment.  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     Currently in PT at [facility], also with dry needling and TENS unit  She was referred to neurosurgery, seen by Dr. Ward  Recommendations are to continue serial monitoring with MRI of the nerve sheath tumor at t12-L1  Her care will be transferred to Dr. Mckenzie, has to reschedule appt, had some work/life conflicts with appt time        GERD  Has seen GI for this  Diagnosed with esophagitis previously  Not on medication for this  Having more bloating/gas after eating meals        ADHD  Compliant with medication, needs refill    Obesity:  Pt tells me today she has been on tirzepatide for several months thru wellness clinic for weight loss, has lost about 30 pounds with medication, feels like sx are controlled      Health Maintenance Due   Topic Date Due    Pneumococcal Vaccines (Age 0-49) (1 of 2 - PCV) Never done    TETANUS VACCINE  08/09/2017    COVID-19 Vaccine (1 - 2024-25 season) Never done     Review of Systems   Constitutional:  Negative for chills and fever.   HENT:  Positive for congestion. Negative for sinus pressure and sore throat.    Respiratory:  Negative for cough and shortness of breath.    Cardiovascular:  Negative for chest pain and palpitations.   Gastrointestinal:  Negative for abdominal pain and nausea.   Skin:  Negative for rash.       Objective:     Vitals:    07/21/25 1122   BP: 110/72   Patient Position: Sitting   Pulse: 96   Resp: 18   Temp: 98 °F (36.7 °C)   SpO2: 99%   Weight: 72.6 kg (160 lb)   Height: 4'  "11" (1.499 m)     Physical Exam  Vitals and nursing note reviewed.   Constitutional:       General: She is not in acute distress.     Appearance: Normal appearance. She is not ill-appearing.   HENT:      Head: Normocephalic and atraumatic.      Right Ear: Tympanic membrane normal.      Left Ear: Tympanic membrane normal.      Nose: Congestion present.      Mouth/Throat:      Mouth: Mucous membranes are moist.      Pharynx: No oropharyngeal exudate or posterior oropharyngeal erythema.   Cardiovascular:      Rate and Rhythm: Normal rate and regular rhythm.   Pulmonary:      Effort: Pulmonary effort is normal. No respiratory distress.      Breath sounds: Normal breath sounds. No wheezing.   Abdominal:      General: Abdomen is flat. There is no distension.      Palpations: Abdomen is soft.      Tenderness: There is no abdominal tenderness. There is no guarding.   Musculoskeletal:      Cervical back: Neck supple.      Right lower leg: No edema.      Left lower leg: No edema.   Lymphadenopathy:      Cervical: No cervical adenopathy.   Neurological:      Mental Status: She is alert.       Assessment/Plan:     1. Wellness examination  Fasting labs done, reviewed  Mammogram scheduled at [facility] for 9/2025  2. Attention deficit hyperactivity disorder (ADHD), predominantly inattentive type  -     dextroamphetamine-amphetamine (ADDERALL) 30 mg Tab; Take 1 tablet (30 mg total) by mouth 2 (two) times a day.  Dispense: 60 tablet; Refill: 0  -     dextroamphetamine-amphetamine (ADDERALL) 30 mg Tab; Take 1 tablet (30 mg total) by mouth 2 (two) times a day.  Dispense: 60 tablet; Refill: 0  PMDP reviewed, refills placed for July, August, September  3. Attention deficit hyperactivity disorder (ADHD), unspecified ADHD type  -     dextroamphetamine-amphetamine 30 mg Tab; Take 1 tablet (30 mg total) by mouth 2 (two) times a day.  Dispense: 60 tablet; Refill: 0    4. Neurofibromatosis, type 1  Stable   Patient to f/u with NF specialist in " [place] per their recommendation  Patient to update me with OV note surveillance recs- is this something I will be ordering or the MD in [place], will discuss at next OV  5. Delta beta zero thalassemia  Stable  F/u with hematology  6. Chronic bilateral low back pain with sciatica, sciatica laterality unspecified  Sx are controlled today  Patient will need new paperwork for FMLA filled out at next OV, advised her to print and be prepared to update me with requests for discussion     Follow up in about 3 months (around 10/21/2025) for Follow Up - Controlled Med and FMLA paperwork.    This includes face to face time and non-face to face time preparing to see the patient (eg, review of tests), obtaining and/or reviewing separately obtained history, documenting clinical information in the electronic or other health record, independently interpreting results and communicating results to the patient/family/caregiver, or care coordinator.

## 2025-07-22 ENCOUNTER — OFFICE VISIT (OUTPATIENT)
Dept: HEMATOLOGY/ONCOLOGY | Facility: CLINIC | Age: 30
End: 2025-07-22
Payer: COMMERCIAL

## 2025-07-22 DIAGNOSIS — D50.9 MICROCYTIC ANEMIA: ICD-10-CM

## 2025-07-22 DIAGNOSIS — E83.110 HEMOCHROMATOSIS, HEREDITARY: ICD-10-CM

## 2025-07-22 DIAGNOSIS — D56.8: Primary | ICD-10-CM

## 2025-07-22 PROCEDURE — 1160F RVW MEDS BY RX/DR IN RCRD: CPT | Mod: CPTII,95,, | Performed by: NURSE PRACTITIONER

## 2025-07-22 PROCEDURE — 98005 SYNCH AUDIO-VIDEO EST LOW 20: CPT | Mod: 95,,, | Performed by: NURSE PRACTITIONER

## 2025-07-22 PROCEDURE — 3044F HG A1C LEVEL LT 7.0%: CPT | Mod: CPTII,95,, | Performed by: NURSE PRACTITIONER

## 2025-07-22 PROCEDURE — G2211 COMPLEX E/M VISIT ADD ON: HCPCS | Mod: 95,,, | Performed by: NURSE PRACTITIONER

## 2025-07-22 PROCEDURE — 1159F MED LIST DOCD IN RCRD: CPT | Mod: CPTII,95,, | Performed by: NURSE PRACTITIONER

## 2025-07-22 NOTE — PROGRESS NOTES
HEMATOLOGY/ONCOLOGY OFFICE CLINIC VISIT    Visit Information:    Initial Evaluation: 10/21/2024  Referring Provider:   Other providers:  Code status:Not addressed    Diagnosis:  Beta zero Thalassemia  Hereditary hemochromatosis-heterogeneous      CLINICAL HISTORY:       Patient: Yue Hickman is a 29 y.o. female with H/o neurofribromatosis, type 1 kindly referred for Microcytic anemia and Elevated ferritin.     Patient is doing relatively well and besides fatigue and tiredness she is doing well. She is not taking iron. No family h/o blood disorders. No recent illness or hospitalizations.  She stated that she took the DNA- test from  and me that showed that she has hemochromatosis. She is not sure how reliable this test is.     Chief Complaint: 4 Month Follow Up (Virtual Visit.)      Interval History:    07/22/25: Patient presents today for 4 month follow up via telemedicine. She continues to have fatigue, but this is improving.  Still with STABLE macrocytic anemia secondary to beta thalassemia. Iron profile and ferritin are normal.     Past Medical History:   Diagnosis Date    ADHD (attention deficit hyperactivity disorder)     Anemia, unspecified type     Anxiety and depression 10/24/2024    Cervicalgia     Chronic low back pain 10/24/2024    Current moderate episode of major depressive disorder without prior episode 11/01/2023    Esophagitis     GERD (gastroesophageal reflux disease)     Low back pain, unspecified     Microcytic anemia 10/21/2024    Muscle weakness (generalized)     Neurofibromatosis, type 1 06/07/2023    Other abnormalities of gait and mobility     Pain in thoracic spine     Pain in unspecified hip     Radiculopathy, lumbar region     Sacroiliitis, not elsewhere classified       Past Surgical History:   Procedure Laterality Date    NEUROFIBROMA EXCISION Right 2017    inner upper arm    SINUS SURGERY      TONSILLECTOMY       Family History   Problem Relation Name Age of Onset    Diabetes Mother  Tierra     Hypertension Mother Tierra     Cancer Father            Review of patient's allergies indicates:   Allergen Reactions    Animal dander       Current Outpatient Medications on File Prior to Visit   Medication Sig Dispense Refill    busPIRone (BUSPAR) 7.5 MG tablet TAKE 1 TABLET BY MOUTH TWICE A DAY 60 tablet 2    dextroamphetamine-amphetamine (ADDERALL) 30 mg Tab Take 1 tablet (30 mg total) by mouth 2 (two) times a day. 60 tablet 0    dextroamphetamine-amphetamine (ADDERALL) 30 mg Tab Take 1 tablet (30 mg total) by mouth 2 (two) times a day. 60 tablet 0    dextroamphetamine-amphetamine 30 mg Tab Take 1 tablet (30 mg total) by mouth 2 (two) times a day. 60 tablet 0    etonogestreL-ethinyl estradioL (NUVARING) 0.12-0.015 mg/24 hr vaginal ring Place vaginally.      fluticasone propionate (FLONASE) 50 mcg/actuation nasal spray 1 spray by Each Nostril route once daily.      gabapentin (NEURONTIN) 100 MG capsule Take 1 capsule (100 mg total) by mouth every evening. 30 capsule 2    ibuprofen (ADVIL,MOTRIN) 200 MG tablet Take 200 mg by mouth every 6 (six) hours as needed for Pain.      ondansetron (ZOFRAN-ODT) 4 MG TbDL TAKE 1 TABLET BY MOUTH EVERY 8  HOURS AS NEEDED NAUSEA 30 tablet 0     Current Facility-Administered Medications on File Prior to Visit   Medication Dose Route Frequency Provider Last Rate Last Admin    cyanocobalamin injection 1,000 mcg  1,000 mcg Intramuscular Once Alda Drummond MD          Review of Systems   Constitutional:  Negative for appetite change and unexpected weight change.   HENT:  Negative for mouth sores.    Eyes:  Negative for visual disturbance.   Respiratory:  Negative for cough and shortness of breath.    Cardiovascular:  Negative for chest pain.   Gastrointestinal:  Negative for abdominal pain and diarrhea.   Genitourinary:  Negative for frequency.   Musculoskeletal:  Positive for back pain.   Integumentary:  Negative for rash.   Neurological:  Negative for headaches.    Hematological:  Negative for adenopathy.   Psychiatric/Behavioral:  The patient is not nervous/anxious.               There were no vitals filed for this visit.       Wt Readings from Last 6 Encounters:   07/21/25 72.6 kg (160 lb)   05/28/25 57.1 kg (125 lb 14.4 oz)   03/19/25 62.6 kg (138 lb)   01/15/25 62.4 kg (137 lb 9.6 oz)   11/26/24 62.1 kg (137 lb)   11/08/24 60.8 kg (134 lb)     There is no height or weight on file to calculate BMI.  There is no height or weight on file to calculate BSA.  Physical Exam  Vitals reviewed: LIMITED DUE TO TELEMEDICINE RESTRICTIONS..   Constitutional:       Appearance: Normal appearance.   HENT:      Head: Normocephalic.   Eyes:      Extraocular Movements: Extraocular movements intact.   Pulmonary:      Effort: Pulmonary effort is normal.   Musculoskeletal:      Cervical back: Neck supple.   Neurological:      Mental Status: She is alert.   Psychiatric:         Mood and Affect: Mood normal.         Behavior: Behavior normal.         Thought Content: Thought content normal.         Judgment: Judgment normal.       Laboratory:  CBC with Differential:  Lab Results   Component Value Date    WBC 5.29 07/17/2025    RBC 4.55 07/17/2025    HGB 11.0 (L) 07/17/2025    HCT 33.8 (L) 07/17/2025    MCV 74.3 (L) 07/17/2025    MCH 24.2 (L) 07/17/2025    MCHC 32.5 (L) 07/17/2025    RDW 19.2 (H) 07/17/2025     07/17/2025    MPV 9.8 07/17/2025        CMP:  Sodium   Date Value Ref Range Status   07/17/2025 139 136 - 145 mmol/L Final     Potassium   Date Value Ref Range Status   07/17/2025 4.1 3.5 - 5.1 mmol/L Final     Chloride   Date Value Ref Range Status   07/17/2025 107 98 - 107 mmol/L Final     CO2   Date Value Ref Range Status   07/17/2025 24 22 - 29 mmol/L Final     Glucose   Date Value Ref Range Status   07/17/2025 84 74 - 100 mg/dL Final     Blood Urea Nitrogen   Date Value Ref Range Status   07/17/2025 12.1 7.0 - 18.7 mg/dL Final     Creatinine   Date Value Ref Range Status    07/17/2025 0.74 0.55 - 1.02 mg/dL Final     Calcium   Date Value Ref Range Status   07/17/2025 9.3 8.4 - 10.2 mg/dL Final     Protein Total   Date Value Ref Range Status   07/17/2025 7.5 6.4 - 8.3 gm/dL Final     Albumin   Date Value Ref Range Status   07/17/2025 3.8 3.5 - 5.0 g/dL Final     Bilirubin Total   Date Value Ref Range Status   07/17/2025 0.9 <=1.5 mg/dL Final     ALP   Date Value Ref Range Status   07/17/2025 29 (L) 40 - 150 unit/L Final     AST   Date Value Ref Range Status   07/17/2025 11 11 - 45 unit/L Final     ALT   Date Value Ref Range Status   07/17/2025 9 0 - 55 unit/L Final     Estimated GFR-Non    Date Value Ref Range Status   03/30/2022 >60        Latest Reference Range & Units 10/16/24 08:01 10/21/24 13:45   Iron 50 - 170 ug/dL 316 (H) 181 (H)   TIBC 250 - 450 ug/dL See Comments 284   UIBC 70 - 310 ug/dL <25 (L) 103   Transferrin 180 - 382 mg/dL 226 245   Ferritin 4.63 - 204.00 ng/mL 68.06 67.94   Folate 7.0 - 31.4 ng/mL 12.3 12.9   Vitamin B12 213 - 816 pg/mL 869 (H) 677   Iron Saturation 20 - 50 % See Comments 64 (H)   (H): Data is abnormally high  (L): Data is abnormally low      Hb electrophoresis:   Beta Globin Sequencing: Positive      The following alterations were detected:   Gene: HBB   Legacy: Beta IVS-II-849, A>G   HGVS: c.316-2A>G   Genomic: [id number]   Heterozygous   Classification: Beta Zero Thalassemia mutation      Hemochromatosis:  C282Y: Not detected.   H63D: One copy of the H63D variant was identified.   SUMMARY INTERPRETATION:   These results confirm a heterozygous beta thalassemia alteration. In heterozygous individuals, this beta zero thalassemia mutation is typically associated with mild to   moderate anemia (Hgb 10-12 g/dL), marked hypochromia (18.4-19.8 fL), variably elevated Hb A2, and elevated Hb F in some individuals that persists into adulthood   (Anaid 1990 PMID [id number]). Clinical correlation is necessary.     This individual is  heterozygous for H63D. This result is consistent with at minimum a carrier status for hereditary hemochromatosis (HH).   The presence of a single H63D variant is unlikely to be of clinical significance in the absence of other disease-causing variants.            Assessment:       1. Delta beta zero thalassemia    2. Hemochromatosis, hereditary    3. Microcytic anemia          1) Beta zero Thalassemia  Explained to the patient that Beta-zero thalassemia is a disorder of the hemoglobin and a severe form of beta thalassemia, is an autosomal recessive disorder, meaning that a person must inherit two mutated copies of the HBB gene (one from each parent) to develop the condition.    Individuals with beta-zero thalassemia can have a good quality of life,however, the condition can be life-threatening without proper management    2) Hereditary Hemochromatosis  Explained to her that Hemochromatosis is a hereditary disorder that causes the body to absorb too much iron resulting in a buildup of iron in the body   Event though them mutation that the patient has for hemochromatosis is H 63 D that as mentioned above consists with a minimum carrier status for hereditary hemochromatosis and probably not clinical significance.    In this case, besides hereditary hemochromatosis patient also with beta zero thalassemia and this combination can both result in the inappropriately low production of the hormone hepcidin, leading to an increase in intestinal absorption and excessive iron deposition in the parenchymal cells.     Patient iron decreased from 316 to 181 Most recent 117.  Ferritin 61.    3) fatigue    4) Neurofibromatosis, type 1       Plan:       Labs are stable/improved.   Will continue to monitor closely.   We will hold phlebotomy for now but may need in the future.  RTC in 4 months labs prior   Labs: CBC, CMP, Iron profile, ferritin, TSH, Vitamin B12    The patient was seen, interviewed and examined. Pertinent lab and  radiology studies were reviewed.   The patient was given ample opportunity to ask questions, and to the best of my abilities, all questions answered to satisfaction; patient demonstrated understanding of what we discussed and agreeable to the plan. Pt instructed to call should develop concerning signs/symptoms or have further questions.     Visit today included increased complexity associated with the care of the episodic problem thalassemia, addressing and managing the longitudinal care of the patient's thalassemia and hemochromatosis.     JONE Willard    The patient location is: [place]  The chief complaint leading to consultation is: Lab results    Visit type: audiovisual    Face to Face time with patient: 13  25 minutes of total time spent on the encounter, which includes face to face time and non-face to face time preparing to see the patient (eg, review of tests), Obtaining and/or reviewing separately obtained history, Documenting clinical information in the electronic or other health record, Independently interpreting results (not separately reported) and communicating results to the patient/family/caregiver, or Care coordination (not separately reported).         Each patient to whom he or she provides medical services by telemedicine is:  (1) informed of the relationship between the physician and patient and the respective role of any other health care provider with respect to management of the patient; and (2) notified that he or she may decline to receive medical services by telemedicine and may withdraw from such care at any time.    Notes:

## 2025-07-23 ENCOUNTER — TELEPHONE (OUTPATIENT)
Dept: FAMILY MEDICINE | Facility: CLINIC | Age: 30
End: 2025-07-23
Payer: COMMERCIAL

## 2025-07-23 DIAGNOSIS — F90.0 ATTENTION DEFICIT HYPERACTIVITY DISORDER (ADHD), PREDOMINANTLY INATTENTIVE TYPE: Chronic | ICD-10-CM

## 2025-07-23 RX ORDER — DEXTROAMPHETAMINE SACCHARATE, AMPHETAMINE ASPARTATE, DEXTROAMPHETAMINE SULFATE AND AMPHETAMINE SULFATE 7.5; 7.5; 7.5; 7.5 MG/1; MG/1; MG/1; MG/1
1 TABLET ORAL 2 TIMES DAILY
Qty: 60 TABLET | Refills: 0 | Status: SHIPPED | OUTPATIENT
Start: 2025-07-23

## 2025-07-23 NOTE — TELEPHONE ENCOUNTER
I have signed for the following orders AND/OR meds. Please notify the patient and ask the patient to schedule the testing and/or information about any medications that were sent.      Medications Ordered This Encounter   Medications    dextroamphetamine-amphetamine (ADDERALL) 30 mg Tab     Sig: Take 1 tablet (30 mg total) by mouth 2 (two) times a day.     Dispense:  60 tablet     Refill:  0     No orders of the defined types were placed in this encounter.

## 2025-07-23 NOTE — TELEPHONE ENCOUNTER
Patient requesting Adderall medication sent to [facility] instead of [facility].

## 2025-07-23 NOTE — TELEPHONE ENCOUNTER
Spoke with pharmacy staff and verbalized medication can be filled today. Patient stated medication needs to be sent to [place] pharmacy instead.

## 2025-07-23 NOTE — TELEPHONE ENCOUNTER
Copied from CRM #[id number]. Topic: General Inquiry - Patient Advice  >> Jul 23, 2025  9:24 AM Olesya wrote:  .Who Called: Yue Hickman    Patient is returning phone call    Who Left Message for Patient:  Does the patient know what this is regarding?: Pt calling in regards to her ADDERALL) 30 mg Tab stated pharmacy will not fill-Please advise- if no answer leave a VM-pt is boarding plane       Preferred Method of Contact: Phone Call  Patient's Preferred Phone Number on File: [phone]   Best Call Back Number, if different:  Additional Information:

## 2025-08-04 ENCOUNTER — TELEPHONE (OUTPATIENT)
Dept: NEUROSURGERY | Facility: CLINIC | Age: 30
End: 2025-08-04

## 2025-08-04 NOTE — TELEPHONE ENCOUNTER
Spoke with patient. Appt moved to 8/8 @ 930. She will call me back if she cannot take off work again. If she cannot make it, I'll check with Dr. Mckenzie to see if appt can be virtual.

## 2025-08-04 NOTE — TELEPHONE ENCOUNTER
Due to emergent surgery, patient's appt for today, 8/4 at 930 am with Dr. Mckenzie needs to be rescheduled to 8/8 at 930 am. I called patient. No answer, left detailed message with new appt time.

## 2025-08-07 ENCOUNTER — PATIENT MESSAGE (OUTPATIENT)
Dept: NEUROSURGERY | Facility: CLINIC | Age: 30
End: 2025-08-07
Payer: COMMERCIAL

## 2025-08-07 NOTE — TELEPHONE ENCOUNTER
I called patient to reschedule appointment with Dr. Mckenzie on 8/8 due to surgery. Patient stated she was leaving work and driving and she would have to look at her schedule, I informed patient that I will cancel tomorrow's appointment and will call her tomorrow to reschedule appointment. Patient verbalized understanding.

## 2025-08-15 ENCOUNTER — OFFICE VISIT (OUTPATIENT)
Dept: NEUROSURGERY | Facility: CLINIC | Age: 30
End: 2025-08-15
Payer: COMMERCIAL

## 2025-08-15 VITALS
HEART RATE: 88 BPM | HEIGHT: 59 IN | SYSTOLIC BLOOD PRESSURE: 114 MMHG | BODY MASS INDEX: 25.97 KG/M2 | WEIGHT: 128.81 LBS | DIASTOLIC BLOOD PRESSURE: 77 MMHG | RESPIRATION RATE: 16 BRPM

## 2025-08-15 DIAGNOSIS — Q85.01 NEUROFIBROMATOSIS, TYPE 1: ICD-10-CM

## 2025-08-15 DIAGNOSIS — D36.14 NEUROFIBROMA OF THORACIC REGION: Primary | ICD-10-CM

## 2025-08-15 DIAGNOSIS — M54.42 CHRONIC BILATERAL LOW BACK PAIN WITH BILATERAL SCIATICA: ICD-10-CM

## 2025-08-15 DIAGNOSIS — M54.41 CHRONIC BILATERAL LOW BACK PAIN WITH BILATERAL SCIATICA: ICD-10-CM

## 2025-08-15 DIAGNOSIS — G89.29 CHRONIC BILATERAL LOW BACK PAIN WITH BILATERAL SCIATICA: ICD-10-CM

## 2025-08-15 RX ORDER — AMOXICILLIN AND CLAVULANATE POTASSIUM 875; 125 MG/1; MG/1
1 TABLET, FILM COATED ORAL 2 TIMES DAILY
COMMUNITY
Start: 2025-02-21 | End: 2025-08-15

## 2025-08-15 RX ORDER — AZITHROMYCIN 500 MG/1
500 TABLET, FILM COATED ORAL DAILY
COMMUNITY
Start: 2025-08-05 | End: 2025-08-15

## 2025-08-15 RX ORDER — HYOSCYAMINE SULFATE 0.12 MG/1
0.12 TABLET, ORALLY DISINTEGRATING ORAL EVERY 4 HOURS PRN
COMMUNITY
Start: 2025-06-02 | End: 2025-08-15

## 2025-08-15 RX ORDER — PREDNISONE 20 MG/1
20 TABLET ORAL DAILY
COMMUNITY
Start: 2025-08-05 | End: 2025-08-15

## 2025-08-15 RX ORDER — CLINDAMYCIN PHOSPHATE 10 UG/ML
60 LOTION TOPICAL
COMMUNITY
Start: 2025-07-02

## 2025-08-15 RX ORDER — DICYCLOMINE HYDROCHLORIDE 10 MG/1
10 CAPSULE ORAL EVERY 6 HOURS PRN
COMMUNITY
Start: 2025-02-21 | End: 2025-08-15

## 2025-08-15 RX ORDER — PROMETHAZINE HYDROCHLORIDE 12.5 MG/1
12.5 TABLET ORAL EVERY 4 HOURS PRN
COMMUNITY
Start: 2025-02-21

## 2025-08-15 RX ORDER — PHENAZOPYRIDINE HYDROCHLORIDE 200 MG/1
200 TABLET, FILM COATED ORAL 3 TIMES DAILY
COMMUNITY
Start: 2025-02-21 | End: 2025-08-15

## 2025-08-21 DIAGNOSIS — F90.0 ATTENTION DEFICIT HYPERACTIVITY DISORDER (ADHD), PREDOMINANTLY INATTENTIVE TYPE: Chronic | ICD-10-CM

## 2025-08-22 RX ORDER — DEXTROAMPHETAMINE SACCHARATE, AMPHETAMINE ASPARTATE, DEXTROAMPHETAMINE SULFATE AND AMPHETAMINE SULFATE 7.5; 7.5; 7.5; 7.5 MG/1; MG/1; MG/1; MG/1
1 TABLET ORAL 2 TIMES DAILY
Qty: 60 TABLET | Refills: 0 | Status: SHIPPED | OUTPATIENT
Start: 2025-08-22

## 2025-08-22 RX ORDER — ONDANSETRON 4 MG/1
4 TABLET, ORALLY DISINTEGRATING ORAL EVERY 8 HOURS PRN
Qty: 30 TABLET | Refills: 0 | Status: SHIPPED | OUTPATIENT
Start: 2025-08-22